# Patient Record
Sex: FEMALE | Race: WHITE | Employment: FULL TIME | URBAN - METROPOLITAN AREA
[De-identification: names, ages, dates, MRNs, and addresses within clinical notes are randomized per-mention and may not be internally consistent; named-entity substitution may affect disease eponyms.]

---

## 2024-03-11 ENCOUNTER — APPOINTMENT (OUTPATIENT)
Dept: CT IMAGING | Age: 39
End: 2024-03-11
Payer: COMMERCIAL

## 2024-03-11 ENCOUNTER — HOSPITAL ENCOUNTER (EMERGENCY)
Age: 39
Discharge: HOME | End: 2024-03-12
Attending: EMERGENCY MEDICINE
Payer: COMMERCIAL

## 2024-03-11 DIAGNOSIS — R10.84 GENERALIZED ABDOMINAL PAIN: Primary | ICD-10-CM

## 2024-03-11 DIAGNOSIS — K80.80 BILIARY CALCULUS OF OTHER SITE WITHOUT OBSTRUCTION: ICD-10-CM

## 2024-03-11 LAB
ALBUMIN SERPL-MCNC: 4.4 G/DL (ref 3.4–5)
ALBUMIN/GLOB SERPL: 2.1 {RATIO} (ref 1.1–2.2)
ALP SERPL-CCNC: 113 U/L (ref 40–129)
ALT SERPL-CCNC: 31 U/L (ref 10–40)
ANION GAP SERPL CALCULATED.3IONS-SCNC: 14 MMOL/L (ref 3–16)
AST SERPL-CCNC: 31 U/L (ref 15–37)
BASOPHILS # BLD: 0 K/UL (ref 0–0.2)
BASOPHILS NFR BLD: 0.3 %
BILIRUB SERPL-MCNC: <0.2 MG/DL (ref 0–1)
BILIRUB UR QL STRIP.AUTO: NEGATIVE
BUN SERPL-MCNC: 17 MG/DL (ref 7–20)
CALCIUM SERPL-MCNC: 7.9 MG/DL (ref 8.3–10.6)
CHLORIDE SERPL-SCNC: 104 MMOL/L (ref 99–110)
CLARITY UR: CLEAR
CO2 SERPL-SCNC: 21 MMOL/L (ref 21–32)
COLOR UR: YELLOW
CREAT SERPL-MCNC: 0.7 MG/DL (ref 0.6–1.1)
DEPRECATED RDW RBC AUTO: 16 % (ref 12.4–15.4)
EOSINOPHIL # BLD: 0 K/UL (ref 0–0.6)
EOSINOPHIL NFR BLD: 0.5 %
FLUAV RNA RESP QL NAA+PROBE: NOT DETECTED
FLUBV RNA RESP QL NAA+PROBE: NOT DETECTED
GFR SERPLBLD CREATININE-BSD FMLA CKD-EPI: >60 ML/MIN/{1.73_M2}
GLUCOSE SERPL-MCNC: 93 MG/DL (ref 70–99)
GLUCOSE UR STRIP.AUTO-MCNC: NEGATIVE MG/DL
HCG UR QL: NEGATIVE
HCT VFR BLD AUTO: 38.7 % (ref 36–48)
HGB BLD-MCNC: 12.3 G/DL (ref 12–16)
HGB UR QL STRIP.AUTO: NEGATIVE
KETONES UR STRIP.AUTO-MCNC: 15 MG/DL
LACTATE BLDV-SCNC: 1.5 MMOL/L (ref 0.4–1.9)
LEUKOCYTE ESTERASE UR QL STRIP.AUTO: NEGATIVE
LIPASE SERPL-CCNC: 38 U/L (ref 13–60)
LYMPHOCYTES # BLD: 0.5 K/UL (ref 1–5.1)
LYMPHOCYTES NFR BLD: 5.2 %
MCH RBC QN AUTO: 29.4 PG (ref 26–34)
MCHC RBC AUTO-ENTMCNC: 31.8 G/DL (ref 31–36)
MCV RBC AUTO: 92.4 FL (ref 80–100)
MONOCYTES # BLD: 0.3 K/UL (ref 0–1.3)
MONOCYTES NFR BLD: 2.6 %
NEUTROPHILS # BLD: 9.4 K/UL (ref 1.7–7.7)
NEUTROPHILS NFR BLD: 91.4 %
NITRITE UR QL STRIP.AUTO: NEGATIVE
PH UR STRIP.AUTO: 6 [PH] (ref 5–8)
PLATELET # BLD AUTO: 306 K/UL (ref 135–450)
PMV BLD AUTO: 9.2 FL (ref 5–10.5)
POTASSIUM SERPL-SCNC: 4.2 MMOL/L (ref 3.5–5.1)
PROT SERPL-MCNC: 6.5 G/DL (ref 6.4–8.2)
PROT UR STRIP.AUTO-MCNC: NEGATIVE MG/DL
RBC # BLD AUTO: 4.18 M/UL (ref 4–5.2)
SARS-COV-2 RNA RESP QL NAA+PROBE: NOT DETECTED
SODIUM SERPL-SCNC: 139 MMOL/L (ref 136–145)
SP GR UR STRIP.AUTO: 1.02 (ref 1–1.03)
UA COMPLETE W REFLEX CULTURE PNL UR: ABNORMAL
UA DIPSTICK W REFLEX MICRO PNL UR: ABNORMAL
URN SPEC COLLECT METH UR: ABNORMAL
UROBILINOGEN UR STRIP-ACNC: 0.2 E.U./DL
WBC # BLD AUTO: 10.2 K/UL (ref 4–11)

## 2024-03-11 PROCEDURE — 83690 ASSAY OF LIPASE: CPT

## 2024-03-11 PROCEDURE — 80053 COMPREHEN METABOLIC PANEL: CPT

## 2024-03-11 PROCEDURE — 96374 THER/PROPH/DIAG INJ IV PUSH: CPT

## 2024-03-11 PROCEDURE — 81003 URINALYSIS AUTO W/O SCOPE: CPT

## 2024-03-11 PROCEDURE — 6360000004 HC RX CONTRAST MEDICATION: Performed by: EMERGENCY MEDICINE

## 2024-03-11 PROCEDURE — 36415 COLL VENOUS BLD VENIPUNCTURE: CPT

## 2024-03-11 PROCEDURE — 83605 ASSAY OF LACTIC ACID: CPT

## 2024-03-11 PROCEDURE — 85025 COMPLETE CBC W/AUTO DIFF WBC: CPT

## 2024-03-11 PROCEDURE — 87636 SARSCOV2 & INF A&B AMP PRB: CPT

## 2024-03-11 PROCEDURE — 84703 CHORIONIC GONADOTROPIN ASSAY: CPT

## 2024-03-11 PROCEDURE — 74177 CT ABD & PELVIS W/CONTRAST: CPT

## 2024-03-11 PROCEDURE — 2580000003 HC RX 258: Performed by: EMERGENCY MEDICINE

## 2024-03-11 PROCEDURE — 6370000000 HC RX 637 (ALT 250 FOR IP): Performed by: EMERGENCY MEDICINE

## 2024-03-11 PROCEDURE — 96375 TX/PRO/DX INJ NEW DRUG ADDON: CPT

## 2024-03-11 PROCEDURE — 6360000002 HC RX W HCPCS: Performed by: EMERGENCY MEDICINE

## 2024-03-11 PROCEDURE — 99285 EMERGENCY DEPT VISIT HI MDM: CPT

## 2024-03-11 RX ORDER — METOCLOPRAMIDE 10 MG/1
10 TABLET ORAL
Qty: 120 TABLET | Refills: 3 | Status: SHIPPED | OUTPATIENT
Start: 2024-03-11

## 2024-03-11 RX ORDER — OXYCODONE HYDROCHLORIDE 5 MG/1
5 TABLET ORAL ONCE
Status: COMPLETED | OUTPATIENT
Start: 2024-03-11 | End: 2024-03-11

## 2024-03-11 RX ORDER — MORPHINE SULFATE 4 MG/ML
4 INJECTION, SOLUTION INTRAMUSCULAR; INTRAVENOUS ONCE
Status: COMPLETED | OUTPATIENT
Start: 2024-03-11 | End: 2024-03-11

## 2024-03-11 RX ORDER — METOCLOPRAMIDE HYDROCHLORIDE 5 MG/ML
10 INJECTION INTRAMUSCULAR; INTRAVENOUS ONCE
Status: COMPLETED | OUTPATIENT
Start: 2024-03-11 | End: 2024-03-11

## 2024-03-11 RX ORDER — ONDANSETRON 2 MG/ML
4 INJECTION INTRAMUSCULAR; INTRAVENOUS ONCE
Status: COMPLETED | OUTPATIENT
Start: 2024-03-11 | End: 2024-03-11

## 2024-03-11 RX ORDER — OXYCODONE HYDROCHLORIDE 5 MG/1
5 TABLET ORAL EVERY 6 HOURS PRN
Qty: 12 TABLET | Refills: 0 | Status: SHIPPED | OUTPATIENT
Start: 2024-03-11 | End: 2024-03-14

## 2024-03-11 RX ORDER — 0.9 % SODIUM CHLORIDE 0.9 %
1000 INTRAVENOUS SOLUTION INTRAVENOUS ONCE
Status: COMPLETED | OUTPATIENT
Start: 2024-03-11 | End: 2024-03-11

## 2024-03-11 RX ORDER — POLYETHYLENE GLYCOL 3350 17 G/17G
17 POWDER, FOR SOLUTION ORAL DAILY
Qty: 255 G | Refills: 1 | Status: SHIPPED | OUTPATIENT
Start: 2024-03-11 | End: 2024-03-16

## 2024-03-11 RX ORDER — MORPHINE SULFATE 4 MG/ML
4 INJECTION, SOLUTION INTRAMUSCULAR; INTRAVENOUS ONCE
Status: DISCONTINUED | OUTPATIENT
Start: 2024-03-11 | End: 2024-03-11

## 2024-03-11 RX ADMIN — OXYCODONE 5 MG: 5 TABLET ORAL at 23:21

## 2024-03-11 RX ADMIN — METOCLOPRAMIDE 10 MG: 5 INJECTION, SOLUTION INTRAMUSCULAR; INTRAVENOUS at 23:20

## 2024-03-11 RX ADMIN — IOPAMIDOL 75 ML: 755 INJECTION, SOLUTION INTRAVENOUS at 21:48

## 2024-03-11 RX ADMIN — SODIUM CHLORIDE 1000 ML: 9 INJECTION, SOLUTION INTRAVENOUS at 20:13

## 2024-03-11 RX ADMIN — MORPHINE SULFATE 4 MG: 4 INJECTION, SOLUTION INTRAMUSCULAR; INTRAVENOUS at 20:15

## 2024-03-11 RX ADMIN — ONDANSETRON 4 MG: 2 INJECTION INTRAMUSCULAR; INTRAVENOUS at 20:13

## 2024-03-11 ASSESSMENT — PAIN DESCRIPTION - PAIN TYPE: TYPE: ACUTE PAIN

## 2024-03-11 ASSESSMENT — PAIN DESCRIPTION - LOCATION
LOCATION: ABDOMEN

## 2024-03-11 ASSESSMENT — PAIN SCALES - GENERAL
PAINLEVEL_OUTOF10: 6
PAINLEVEL_OUTOF10: 4
PAINLEVEL_OUTOF10: 8

## 2024-03-11 ASSESSMENT — PAIN DESCRIPTION - ORIENTATION
ORIENTATION: MID;UPPER

## 2024-03-11 ASSESSMENT — LIFESTYLE VARIABLES
HOW OFTEN DO YOU HAVE A DRINK CONTAINING ALCOHOL: 4 OR MORE TIMES A WEEK
HOW MANY STANDARD DRINKS CONTAINING ALCOHOL DO YOU HAVE ON A TYPICAL DAY: 3 OR 4

## 2024-03-11 ASSESSMENT — ENCOUNTER SYMPTOMS
DIARRHEA: 1
ABDOMINAL PAIN: 1
CHEST TIGHTNESS: 0
COUGH: 0
VOMITING: 1
NAUSEA: 1
BACK PAIN: 0
RHINORRHEA: 0
SHORTNESS OF BREATH: 0

## 2024-03-11 ASSESSMENT — PAIN DESCRIPTION - DESCRIPTORS
DESCRIPTORS: ACHING;CRAMPING
DESCRIPTORS: ACHING;SHARP

## 2024-03-11 ASSESSMENT — PAIN - FUNCTIONAL ASSESSMENT
PAIN_FUNCTIONAL_ASSESSMENT: 0-10
PAIN_FUNCTIONAL_ASSESSMENT: ACTIVITIES ARE NOT PREVENTED

## 2024-03-12 VITALS
BODY MASS INDEX: 38.41 KG/M2 | TEMPERATURE: 98.8 F | RESPIRATION RATE: 16 BRPM | HEIGHT: 64 IN | DIASTOLIC BLOOD PRESSURE: 66 MMHG | WEIGHT: 225 LBS | SYSTOLIC BLOOD PRESSURE: 115 MMHG | HEART RATE: 77 BPM | OXYGEN SATURATION: 100 %

## 2024-03-12 NOTE — ED NOTES
Pt AxOx4. Pt is satisfied with pain relief at this time. VS charted. Pt resting comfortably in bed. Call light within reach.

## 2024-03-12 NOTE — ED PROVIDER NOTES
Emergency Department Attending Physician Note  Location: [facility] ED  3/11/2024       Pt Name: Shahid Garcia  MRN: [id number]  Birthdate 1985    Date of evaluation: 3/11/2024  Provider: BRIDGET DANIELSON DO  PCP: No primary care provider on file.    Note Started: 8:25 PM EDT 3/11/24    CHIEF COMPLAINT  Chief Complaint   Patient presents with    Abdominal Pain     X3 days, upper  middle abd pain that hurts worse when she eats. Was supposed to have gallbladder removed but pt states \"she doesn't have time for that\"       ROOM: 7    HISTORY OF PRESENT ILLNESS:  History obtained by patient. Limitations to history : None.     Shahid Garcia is a 39 y.o. female with a significant PMHx of MS, narcolepsy, and history of seizure disorder, presenting emergency department today with concerns of nausea and vomiting, at least for 5 times a day for the last 3 days, with left-sided abdominal pain, and also epigastric abdominal pain.  She says anytime she eats, she vomits immediately back up.  She says her abdominal pain feels like a bloating and a pressure.  She has been told that she needed her gallbladder out in the past, however she says she was essentially lost to follow-up and did not make time to go to her follow-up appointments.  Denies any new seizures, or any MS symptoms, and does admit she has not been taking her medicine as she should over the past month.  Has a follow-up neurology appointment next week.  Denies any blood in her vomitus or diarrhea.  He is really concerned that she is having a gallbladder flare.  No other concerns today in the emergency department including fevers or chills.  No known sick contacts.  No suspicious food intake.  No falls or injuries.  No back pain or dysuria.  No headaches or vision changes.      Nursing Notes were all reviewed and agreed with or any disagreements were addressed in the HPI.      MEDICAL HISTORY  Past Medical History:   Diagnosis Date    MS

## 2025-03-18 ENCOUNTER — HOSPITAL ENCOUNTER (EMERGENCY)
Age: 40
Discharge: LEFT BEFORE DISCHARGE | End: 2025-03-18
Attending: EMERGENCY MEDICINE
Payer: COMMERCIAL

## 2025-03-18 VITALS
SYSTOLIC BLOOD PRESSURE: 117 MMHG | BODY MASS INDEX: 36.54 KG/M2 | HEIGHT: 64 IN | OXYGEN SATURATION: 97 % | TEMPERATURE: 98.4 F | RESPIRATION RATE: 20 BRPM | HEART RATE: 74 BPM | WEIGHT: 214 LBS | DIASTOLIC BLOOD PRESSURE: 79 MMHG

## 2025-03-18 DIAGNOSIS — F10.20 ALCOHOLISM (HCC): Primary | ICD-10-CM

## 2025-03-18 PROCEDURE — 99281 EMR DPT VST MAYX REQ PHY/QHP: CPT

## 2025-03-18 RX ORDER — LORAZEPAM 1 MG/1
1 TABLET ORAL ONCE
Status: DISCONTINUED | OUTPATIENT
Start: 2025-03-18 | End: 2025-03-19 | Stop reason: HOSPADM

## 2025-03-19 NOTE — ED NOTES
Pt not in room. Pt told Dr. Miranda, \"Im just going to come back in the morning when I am actually in withdraw\". Pt left department in stable condition per Dr. Miranda.

## 2025-07-08 ENCOUNTER — HOSPITAL ENCOUNTER (INPATIENT)
Age: 40
LOS: 3 days | Discharge: HOME | End: 2025-07-11
Attending: EMERGENCY MEDICINE | Admitting: PSYCHIATRY & NEUROLOGY
Payer: COMMERCIAL

## 2025-07-08 DIAGNOSIS — F10.920 ACUTE ALCOHOLIC INTOXICATION WITHOUT COMPLICATION: ICD-10-CM

## 2025-07-08 DIAGNOSIS — T51.92XA: ICD-10-CM

## 2025-07-08 DIAGNOSIS — R45.851 SUICIDAL IDEATION: Primary | ICD-10-CM

## 2025-07-08 PROBLEM — F33.9 MAJOR DEPRESSION, RECURRENT: Status: ACTIVE | Noted: 2025-07-08

## 2025-07-08 PROBLEM — F32.9 MAJOR DEPRESSIVE EPISODE: Status: ACTIVE | Noted: 2025-07-08

## 2025-07-08 LAB
ALBUMIN SERPL-MCNC: 3.8 G/DL (ref 3.4–5)
ALBUMIN/GLOB SERPL: 1.7 {RATIO} (ref 1.1–2.2)
ALP SERPL-CCNC: 130 U/L (ref 40–129)
ALT SERPL-CCNC: 18 U/L (ref 10–40)
AMPHETAMINES UR QL SCN>1000 NG/ML: ABNORMAL
ANION GAP SERPL CALCULATED.3IONS-SCNC: 11 MMOL/L (ref 3–16)
APAP SERPL-MCNC: 9 UG/ML (ref 10–30)
AST SERPL-CCNC: 32 U/L (ref 15–37)
BARBITURATES UR QL SCN>200 NG/ML: ABNORMAL
BASOPHILS # BLD: 0 K/UL (ref 0–0.2)
BASOPHILS NFR BLD: 1 %
BENZODIAZ UR QL SCN>200 NG/ML: POSITIVE
BILIRUB SERPL-MCNC: <0.2 MG/DL (ref 0–1)
BILIRUB UR QL STRIP.AUTO: NEGATIVE
BUN SERPL-MCNC: 15 MG/DL (ref 7–20)
CALCIUM SERPL-MCNC: 8.5 MG/DL (ref 8.3–10.6)
CANNABINOIDS UR QL SCN>50 NG/ML: ABNORMAL
CHLORIDE SERPL-SCNC: 107 MMOL/L (ref 99–110)
CLARITY UR: ABNORMAL
CO2 SERPL-SCNC: 22 MMOL/L (ref 21–32)
COCAINE UR QL SCN: ABNORMAL
COLOR UR: YELLOW
CREAT SERPL-MCNC: 0.7 MG/DL (ref 0.6–1.1)
DEPRECATED RDW RBC AUTO: 14.9 % (ref 12.4–15.4)
DRUG SCREEN COMMENT UR-IMP: ABNORMAL
EOSINOPHIL # BLD: 0.1 K/UL (ref 0–0.6)
EOSINOPHIL NFR BLD: 3.4 %
ETHANOLAMINE SERPL-MCNC: 188 MG/DL (ref 0–0.08)
ETHANOLAMINE SERPL-MCNC: 47 MG/DL (ref 0–0.08)
FENTANYL SCREEN, URINE: ABNORMAL
GFR SERPLBLD CREATININE-BSD FMLA CKD-EPI: >90 ML/MIN/{1.73_M2}
GLUCOSE SERPL-MCNC: 160 MG/DL (ref 70–99)
GLUCOSE UR STRIP.AUTO-MCNC: NEGATIVE MG/DL
HCG UR QL: NEGATIVE
HCT VFR BLD AUTO: 37.8 % (ref 36–48)
HGB BLD-MCNC: 12.5 G/DL (ref 12–16)
HGB UR QL STRIP.AUTO: NEGATIVE
KETONES UR STRIP.AUTO-MCNC: NEGATIVE MG/DL
LEUKOCYTE ESTERASE UR QL STRIP.AUTO: NEGATIVE
LYMPHOCYTES # BLD: 0.9 K/UL (ref 1–5.1)
LYMPHOCYTES NFR BLD: 23.5 %
MCH RBC QN AUTO: 31.3 PG (ref 26–34)
MCHC RBC AUTO-ENTMCNC: 33 G/DL (ref 31–36)
MCV RBC AUTO: 94.8 FL (ref 80–100)
METHADONE UR QL SCN>300 NG/ML: ABNORMAL
MONOCYTES # BLD: 0.3 K/UL (ref 0–1.3)
MONOCYTES NFR BLD: 7.8 %
NEUTROPHILS # BLD: 2.3 K/UL (ref 1.7–7.7)
NEUTROPHILS NFR BLD: 64.3 %
NITRITE UR QL STRIP.AUTO: NEGATIVE
OPIATES UR QL SCN>300 NG/ML: ABNORMAL
OXYCODONE UR QL SCN: ABNORMAL
PCP UR QL SCN>25 NG/ML: ABNORMAL
PH UR STRIP.AUTO: 6 [PH] (ref 5–8)
PH UR STRIP: 5 [PH]
PLATELET # BLD AUTO: 221 K/UL (ref 135–450)
PMV BLD AUTO: 8.4 FL (ref 5–10.5)
POTASSIUM SERPL-SCNC: 4.2 MMOL/L (ref 3.5–5.1)
PROT SERPL-MCNC: 6.1 G/DL (ref 6.4–8.2)
PROT UR STRIP.AUTO-MCNC: NEGATIVE MG/DL
RBC # BLD AUTO: 3.99 M/UL (ref 4–5.2)
SALICYLATES SERPL-MCNC: <0.5 MG/DL (ref 15–30)
SODIUM SERPL-SCNC: 140 MMOL/L (ref 136–145)
SP GR UR STRIP.AUTO: 1.02 (ref 1–1.03)
TSH SERPL DL<=0.005 MIU/L-ACNC: 1.29 UIU/ML (ref 0.27–4.2)
UA COMPLETE W REFLEX CULTURE PNL UR: ABNORMAL
UA DIPSTICK W REFLEX MICRO PNL UR: ABNORMAL
URN SPEC COLLECT METH UR: ABNORMAL
UROBILINOGEN UR STRIP-ACNC: 0.2 E.U./DL
WBC # BLD AUTO: 3.6 K/UL (ref 4–11)

## 2025-07-08 PROCEDURE — 99285 EMERGENCY DEPT VISIT HI MDM: CPT

## 2025-07-08 PROCEDURE — 80307 DRUG TEST PRSMV CHEM ANLYZR: CPT

## 2025-07-08 PROCEDURE — 84443 ASSAY THYROID STIM HORMONE: CPT

## 2025-07-08 PROCEDURE — 36415 COLL VENOUS BLD VENIPUNCTURE: CPT

## 2025-07-08 PROCEDURE — 80061 LIPID PANEL: CPT

## 2025-07-08 PROCEDURE — 1240000000 HC EMOTIONAL WELLNESS R&B

## 2025-07-08 PROCEDURE — 6370000000 HC RX 637 (ALT 250 FOR IP): Performed by: PHYSICIAN ASSISTANT

## 2025-07-08 PROCEDURE — 80053 COMPREHEN METABOLIC PANEL: CPT

## 2025-07-08 PROCEDURE — 80179 DRUG ASSAY SALICYLATE: CPT

## 2025-07-08 PROCEDURE — 85025 COMPLETE CBC W/AUTO DIFF WBC: CPT

## 2025-07-08 PROCEDURE — 83036 HEMOGLOBIN GLYCOSYLATED A1C: CPT

## 2025-07-08 PROCEDURE — 6370000000 HC RX 637 (ALT 250 FOR IP): Performed by: PSYCHIATRY & NEUROLOGY

## 2025-07-08 PROCEDURE — 93005 ELECTROCARDIOGRAM TRACING: CPT | Performed by: PSYCHIATRY & NEUROLOGY

## 2025-07-08 PROCEDURE — 80143 DRUG ASSAY ACETAMINOPHEN: CPT

## 2025-07-08 PROCEDURE — 84703 CHORIONIC GONADOTROPIN ASSAY: CPT

## 2025-07-08 PROCEDURE — 81003 URINALYSIS AUTO W/O SCOPE: CPT

## 2025-07-08 PROCEDURE — 82077 ASSAY SPEC XCP UR&BREATH IA: CPT

## 2025-07-08 RX ORDER — DIAZEPAM 10 MG/2ML
15 INJECTION, SOLUTION INTRAMUSCULAR; INTRAVENOUS EVERY 4 HOURS PRN
Status: DISCONTINUED | OUTPATIENT
Start: 2025-07-08 | End: 2025-07-09

## 2025-07-08 RX ORDER — MAGNESIUM HYDROXIDE/ALUMINUM HYDROXICE/SIMETHICONE 120; 1200; 1200 MG/30ML; MG/30ML; MG/30ML
30 SUSPENSION ORAL EVERY 6 HOURS PRN
Status: DISCONTINUED | OUTPATIENT
Start: 2025-07-08 | End: 2025-07-11 | Stop reason: HOSPADM

## 2025-07-08 RX ORDER — DIAZEPAM 10 MG/2ML
10 INJECTION, SOLUTION INTRAMUSCULAR; INTRAVENOUS
Status: DISCONTINUED | OUTPATIENT
Start: 2025-07-08 | End: 2025-07-08

## 2025-07-08 RX ORDER — DIAZEPAM 10 MG/2ML
20 INJECTION, SOLUTION INTRAMUSCULAR; INTRAVENOUS
Status: DISCONTINUED | OUTPATIENT
Start: 2025-07-08 | End: 2025-07-09

## 2025-07-08 RX ORDER — DIAZEPAM 10 MG/2ML
5 INJECTION, SOLUTION INTRAMUSCULAR; INTRAVENOUS
Status: DISCONTINUED | OUTPATIENT
Start: 2025-07-08 | End: 2025-07-08

## 2025-07-08 RX ORDER — OLANZAPINE 10 MG/1
10 TABLET, FILM COATED ORAL EVERY 4 HOURS PRN
Status: DISCONTINUED | OUTPATIENT
Start: 2025-07-08 | End: 2025-07-11 | Stop reason: HOSPADM

## 2025-07-08 RX ORDER — DIAZEPAM 5 MG/1
20 TABLET ORAL
Status: DISCONTINUED | OUTPATIENT
Start: 2025-07-08 | End: 2025-07-08

## 2025-07-08 RX ORDER — DIAZEPAM 10 MG/2ML
10 INJECTION, SOLUTION INTRAMUSCULAR; INTRAVENOUS
Status: DISCONTINUED | OUTPATIENT
Start: 2025-07-08 | End: 2025-07-09

## 2025-07-08 RX ORDER — BENZTROPINE MESYLATE 1 MG/ML
2 INJECTION, SOLUTION INTRAMUSCULAR; INTRAVENOUS 2 TIMES DAILY PRN
Status: DISCONTINUED | OUTPATIENT
Start: 2025-07-08 | End: 2025-07-11 | Stop reason: HOSPADM

## 2025-07-08 RX ORDER — DIAZEPAM 5 MG/1
10 TABLET ORAL
Status: DISCONTINUED | OUTPATIENT
Start: 2025-07-08 | End: 2025-07-08

## 2025-07-08 RX ORDER — DIAZEPAM 5 MG/1
5 TABLET ORAL
Status: DISCONTINUED | OUTPATIENT
Start: 2025-07-08 | End: 2025-07-08

## 2025-07-08 RX ORDER — DIAZEPAM 10 MG/1
20 TABLET ORAL
Status: DISCONTINUED | OUTPATIENT
Start: 2025-07-08 | End: 2025-07-09

## 2025-07-08 RX ORDER — NICOTINE 21 MG/24HR
1 PATCH, TRANSDERMAL 24 HOURS TRANSDERMAL DAILY
Status: DISCONTINUED | OUTPATIENT
Start: 2025-07-08 | End: 2025-07-11 | Stop reason: HOSPADM

## 2025-07-08 RX ORDER — ONDANSETRON 4 MG/1
4 TABLET, ORALLY DISINTEGRATING ORAL ONCE
Status: COMPLETED | OUTPATIENT
Start: 2025-07-08 | End: 2025-07-08

## 2025-07-08 RX ORDER — DIAZEPAM 5 MG/1
5 TABLET ORAL
Status: DISCONTINUED | OUTPATIENT
Start: 2025-07-08 | End: 2025-07-09

## 2025-07-08 RX ORDER — LANOLIN ALCOHOL/MO/W.PET/CERES
100 CREAM (GRAM) TOPICAL DAILY
Status: DISCONTINUED | OUTPATIENT
Start: 2025-07-08 | End: 2025-07-11 | Stop reason: HOSPADM

## 2025-07-08 RX ORDER — LORAZEPAM 1 MG/1
1 TABLET ORAL ONCE
Status: COMPLETED | OUTPATIENT
Start: 2025-07-08 | End: 2025-07-08

## 2025-07-08 RX ORDER — DIAZEPAM 5 MG/1
15 TABLET ORAL
Status: DISCONTINUED | OUTPATIENT
Start: 2025-07-08 | End: 2025-07-08

## 2025-07-08 RX ORDER — DIAZEPAM 10 MG/1
10 TABLET ORAL
Status: DISCONTINUED | OUTPATIENT
Start: 2025-07-08 | End: 2025-07-09

## 2025-07-08 RX ORDER — TRAZODONE HYDROCHLORIDE 50 MG/1
50 TABLET ORAL NIGHTLY PRN
Status: DISCONTINUED | OUTPATIENT
Start: 2025-07-08 | End: 2025-07-11 | Stop reason: HOSPADM

## 2025-07-08 RX ORDER — DIAZEPAM 10 MG/2ML
20 INJECTION, SOLUTION INTRAMUSCULAR; INTRAVENOUS
Status: DISCONTINUED | OUTPATIENT
Start: 2025-07-08 | End: 2025-07-08

## 2025-07-08 RX ORDER — SODIUM CHLORIDE 0.9 % (FLUSH) 0.9 %
5-40 SYRINGE (ML) INJECTION EVERY 12 HOURS SCHEDULED
Status: DISCONTINUED | OUTPATIENT
Start: 2025-07-08 | End: 2025-07-09

## 2025-07-08 RX ORDER — SODIUM CHLORIDE 0.9 % (FLUSH) 0.9 %
5-40 SYRINGE (ML) INJECTION PRN
Status: DISCONTINUED | OUTPATIENT
Start: 2025-07-08 | End: 2025-07-11 | Stop reason: HOSPADM

## 2025-07-08 RX ORDER — HYDROXYZINE HYDROCHLORIDE 50 MG/1
50 TABLET, FILM COATED ORAL 3 TIMES DAILY PRN
Status: DISCONTINUED | OUTPATIENT
Start: 2025-07-08 | End: 2025-07-11 | Stop reason: HOSPADM

## 2025-07-08 RX ORDER — SODIUM CHLORIDE 9 MG/ML
INJECTION, SOLUTION INTRAVENOUS PRN
Status: DISCONTINUED | OUTPATIENT
Start: 2025-07-08 | End: 2025-07-11 | Stop reason: HOSPADM

## 2025-07-08 RX ORDER — IBUPROFEN 400 MG/1
400 TABLET, FILM COATED ORAL EVERY 6 HOURS PRN
Status: DISCONTINUED | OUTPATIENT
Start: 2025-07-08 | End: 2025-07-11 | Stop reason: HOSPADM

## 2025-07-08 RX ORDER — ACETAMINOPHEN 325 MG/1
650 TABLET ORAL EVERY 4 HOURS PRN
Status: DISCONTINUED | OUTPATIENT
Start: 2025-07-08 | End: 2025-07-11 | Stop reason: HOSPADM

## 2025-07-08 RX ORDER — POLYETHYLENE GLYCOL 3350 17 G
2 POWDER IN PACKET (EA) ORAL
Status: DISCONTINUED | OUTPATIENT
Start: 2025-07-08 | End: 2025-07-11 | Stop reason: HOSPADM

## 2025-07-08 RX ORDER — DIAZEPAM 10 MG/2ML
5 INJECTION, SOLUTION INTRAMUSCULAR; INTRAVENOUS
Status: DISCONTINUED | OUTPATIENT
Start: 2025-07-08 | End: 2025-07-09

## 2025-07-08 RX ADMIN — DIAZEPAM 10 MG: 10 TABLET ORAL at 22:05

## 2025-07-08 RX ADMIN — Medication 100 MG: at 19:50

## 2025-07-08 RX ADMIN — ONDANSETRON 4 MG: 4 TABLET, ORALLY DISINTEGRATING ORAL at 21:54

## 2025-07-08 RX ADMIN — LORAZEPAM 1 MG: 1 TABLET ORAL at 19:50

## 2025-07-08 ASSESSMENT — LIFESTYLE VARIABLES
HOW MANY STANDARD DRINKS CONTAINING ALCOHOL DO YOU HAVE ON A TYPICAL DAY: 10 OR MORE
HOW OFTEN DO YOU HAVE A DRINK CONTAINING ALCOHOL: 4 OR MORE TIMES A WEEK
HOW MANY STANDARD DRINKS CONTAINING ALCOHOL DO YOU HAVE ON A TYPICAL DAY: 10 OR MORE
HOW OFTEN DO YOU HAVE A DRINK CONTAINING ALCOHOL: 4 OR MORE TIMES A WEEK

## 2025-07-08 ASSESSMENT — PATIENT HEALTH QUESTIONNAIRE - PHQ9
1. LITTLE INTEREST OR PLEASURE IN DOING THINGS: SEVERAL DAYS
2. FEELING DOWN, DEPRESSED OR HOPELESS: SEVERAL DAYS
SUM OF ALL RESPONSES TO PHQ QUESTIONS 1-9: 2

## 2025-07-08 ASSESSMENT — PAIN - FUNCTIONAL ASSESSMENT: PAIN_FUNCTIONAL_ASSESSMENT: NONE - DENIES PAIN

## 2025-07-08 ASSESSMENT — SLEEP AND FATIGUE QUESTIONNAIRES
SLEEP PATTERN: DIFFICULTY FALLING ASLEEP;DISTURBED/INTERRUPTED SLEEP;RESTLESSNESS
DO YOU USE A SLEEP AID: YES
DO YOU HAVE DIFFICULTY SLEEPING: YES
AVERAGE NUMBER OF SLEEP HOURS: 4

## 2025-07-08 NOTE — ED NOTES
Patient presented to ED via Private Vehicle for evaluation. Explained process of securing patient belongings for patient/staff safety, patient verbalized understanding. Security at bedside, metal detector wanding completed. Patient changed into safety gown. All belongings itemized by  Cordell, placed in labeled bag, removed from the patient care area, and taken to the security locker. Itemized list placed with belongings, in patient record, and a copy given to the patient.

## 2025-07-08 NOTE — ED PROVIDER NOTES
[facility] EMERGENCY DEPARTMENT  EMERGENCY DEPARTMENT ENCOUNTER        Pt Name: Shahid Garcia  MRN: [id number]  Birthdate 1985  Date of evaluation: 7/8/2025  Provider: Mesha Mckeon PA-C  PCP: No primary care provider on file.  Note Started: 4:42 PM EDT 7/8/25       I have seen and evaluated this patient with my supervising physician Noemi Johnston MD.    CHIEF COMPLAINT       Chief Complaint   Patient presents with    Suicide Attempt     Pt in with SI attempt. Pt said she just lost her fiance and wanted to kill herself today by drinking alcohol. Pt states she has been drinking about 2/5th's of vodka daily for about 2 weeks, but today had a whole bottle.        HISTORY OF PRESENT ILLNESS: 1 or more Elements     History From: Patient    Limitations to history : None    Social Determinants Significantly Affecting Health : None    Chief Complaint: Suicide attempt    Shahid Garcia is a 40 y.o. female with a PMHx of multiple sclerosis, narcolepsy, who presents to the ED reporting that she attempted to kill herself by drinking alcohol.  Patient notes that her fiancé checked into rehab recently, and since being alone at home she has broken her 3-month sobriety and began drinking.  She has been drinking 2/5ths of vodka daily, and today drank the entire bottle with the intent to kill herself.  She has had multiple suicide attempts before, withdrawing, Tylenol overdose, and alcohol poisoning.  She denies HI.  She does report she has had alcohol withdrawal before, usually experiences tremors and hallucinations, but has never had any seizures.  She quotes her last drink was \"a little bit ago\" but does say that she is intoxicated currently.  She reports that she called her psychiatrist earlier today, and was told to check her self in for inpatient admission after her suicide attempt.  She denies any current medical complaints.    Nursing Notes were all reviewed and agreed with or any disagreements were     All other components within normal limits   SALICYLATE LEVEL - Abnormal; Notable for the following components:    Salicylate Lvl <0.5 (*)     All other components within normal limits   CBC WITH AUTO DIFFERENTIAL - Abnormal; Notable for the following components:    WBC 3.6 (*)     RBC 3.99 (*)     Lymphocytes Absolute 0.9 (*)     All other components within normal limits   COMPREHENSIVE METABOLIC PANEL W/ REFLEX TO MG FOR LOW K - Abnormal; Notable for the following components:    Glucose 160 (*)     Total Protein 6.1 (*)     Alkaline Phosphatase 130 (*)     All other components within normal limits   URINE DRUG SCREEN - Abnormal; Notable for the following components:    Benzodiazepine Screen, Urine POSITIVE (*)     All other components within normal limits   ETHANOL   PREGNANCY, URINE   ETHANOL   TSH   LIPID PANEL   HEMOGLOBIN A1C       When ordered only abnormal lab results are displayed. All other labs were within normal range or not returned as of this dictation.    EKG: When ordered, EKG's are interpreted by the Emergency Department Physician in the absence of a cardiologist.  Please see their note for interpretation of EKG.    RADIOLOGY:   Non-plain film images such as CT, Ultrasound and MRI are read by the radiologist. Plain radiographic images are visualized and preliminarily interpreted by the ED Provider with the below findings:      N/a    Interpretation per the Radiologist below, if available at the time of this note:    No orders to display     No results found.     No results found.    PROCEDURES   Unless otherwise noted below, none     Procedures    CRITICAL CARE TIME (.cctime)   I independently provided 0 minutes out of the total shared critical care time, excluding separately billable procedures.       EMERGENCY DEPARTMENT COURSE and DIFFERENTIAL DIAGNOSIS/MDM:   Vitals:    Vitals:    07/08/25 1511 07/08/25 2100   BP: 107/66 (!) 144/87   Pulse: 73 69   Resp: 18 16   Temp: 98.4 °F (36.9 °C) 98.4 °F

## 2025-07-08 NOTE — ED NOTES
Transfer Center Handoff for Behavioral Health Transfers      Patient's Current Location: [facility] EMERGENCY DEPARTMENT     Chief Complaint   Patient presents with    Suicide Attempt     Pt in with SI attempt. Pt said she just lost her fiance and wanted to kill herself today by drinking alcohol. Pt states she has been drinking about 2/5th's of vodka daily for about 2 weeks, but today had a whole bottle.        Current or History of Violent Behavior: No    Currently in Restraints Now or During this Encounter: No  (Specify if Agitation or self harm is noted in ED?)  If yes, please describe behaviors requiring restraint:             Medical Clearance Documented and Verified in the Chart: Yes    Allergies   Allergen Reactions    Nsaids Other (See Comments)     ulcers        Can Patient Tolerate Lying Flat: Yes    Able to Perform ADLs:  Yes  (Specify if able to ambulate or uses any mobility devices such as cane or walker)  Activity:    Level of Assistance:    Assistive Device:    Miscellaneous Devices:      LABS    CBC:   Lab Results   Component Value Date/Time    WBC 3.6 07/08/2025 04:13 PM    RBC 3.99 07/08/2025 04:13 PM    HGB 12.5 07/08/2025 04:13 PM    HCT 37.8 07/08/2025 04:13 PM    MCV 94.8 07/08/2025 04:13 PM    MCH 31.3 07/08/2025 04:13 PM    MCHC 33.0 07/08/2025 04:13 PM    RDW 14.9 07/08/2025 04:13 PM     07/08/2025 04:13 PM    MPV 8.4 07/08/2025 04:13 PM     CMP:   Lab Results   Component Value Date/Time     07/08/2025 04:13 PM    K 4.2 07/08/2025 04:13 PM     07/08/2025 04:13 PM    CO2 22 07/08/2025 04:13 PM    BUN 15 07/08/2025 04:13 PM    CREATININE 0.7 07/08/2025 04:13 PM    AGRATIO 1.7 07/08/2025 04:13 PM    LABGLOM >90 07/08/2025 04:13 PM    LABGLOM >60 03/11/2024 07:45 PM    GLUCOSE 160 07/08/2025 04:13 PM    CALCIUM 8.5 07/08/2025 04:13 PM    BILITOT <0.2 07/08/2025 04:13 PM    ALKPHOS 130 07/08/2025 04:13 PM    AST 32 07/08/2025 04:13 PM    ALT 18 07/08/2025 04:13 PM     Drug  considerations:  No  (pregnancy, autism, developmental disabled, etc.)    Does the patient have confirmed or suspected COVID-19 Symptoms: No  (if yes, test must be completed, if no test is not required)       Last COVID Lab SARS-CoV-2 RNA, RT PCR (no units)   Date Value   03/11/2024 NOT DETECTED              Immunization status:   Immunization History   Administered Date(s) Administered    COVID-19, PFIZER PURPLE top, DILUTE for use, (age 12 y+), 30mcg/0.3mL 03/12/2021, 04/01/2021        Relaxed

## 2025-07-08 NOTE — ED PROVIDER NOTES
AttestationThe patient was seen by me in conjunction with the advanced practice clinician. I have personally performed and/or participated in the history, exam and medical decision making and agree with all pertinent clinical information. All lab results, EKG tracings, and radiographic studies or interpretations were reviewed by me.    I have participated in the key portions of the examination and treatment plan for the patient. I have reviewed the APC's findings and agree.I have personally performed a face-to-face diagnostic evaluation on the patient.  My findings are as follows:    This patient presented with feeling suicidal, recent family stressors, states that she had drank regularly for years, was on Vivitrol and was doing well, but has been drinking daily again for the last 2 weeks.  She states she feels like she wanted to kill herself so she drank a bottle of vodka in an attempt to do that.  .   They appear non toxic.   Vitals viewed.   Exam shows: Constitutional: Patient appears well-developed and well-nourished and is active.  She states that she feels anxious, she is agitated moving about in the bed, reports last drink was in the parking lot before she walked into the emergency department.    HENT:   Head: Normocephalic and atraumatic.   Nose: Nose normal.   Mouth/Throat: Mucous membranes are moist.   Eyes: Conjunctivae, extraocular motions and lids are normal.   Neck: Normal range of motion. Neck supple.   Pulmonary/Chest: Effort normal without distress   Musculoskeletal: Normal range of motion.   Neurological: Patient is alert.  Anxious, hyperactive, speech is clear and articulate, mental status intact, thought process intact, patient exhibits suicidal ideation  Skin: Skin is warm, dry and intact.   Psychiatric:There is a normal mood and affect. The speech is normal and behavior is normal.      Impression: Suicidal, alcohol abuse, likely alcohol dependence  Patient would benefit from hospitalization

## 2025-07-09 PROBLEM — F10.920 ALCOHOLIC INTOXICATION WITHOUT COMPLICATION: Status: ACTIVE | Noted: 2025-07-09

## 2025-07-09 PROBLEM — R45.851 SUICIDAL IDEATION: Status: ACTIVE | Noted: 2025-07-09

## 2025-07-09 PROBLEM — E66.9 OBESITY (BMI 35.0-39.9 WITHOUT COMORBIDITY): Status: ACTIVE | Noted: 2025-07-09

## 2025-07-09 PROBLEM — M54.12 CERVICAL RADICULITIS: Status: ACTIVE | Noted: 2025-07-09

## 2025-07-09 LAB
CHOLEST SERPL-MCNC: 159 MG/DL (ref 0–199)
EKG ATRIAL RATE: 66 BPM
EKG DIAGNOSIS: NORMAL
EKG P AXIS: 57 DEGREES
EKG P-R INTERVAL: 130 MS
EKG Q-T INTERVAL: 378 MS
EKG QRS DURATION: 92 MS
EKG QTC CALCULATION (BAZETT): 396 MS
EKG R AXIS: 30 DEGREES
EKG T AXIS: 44 DEGREES
EKG VENTRICULAR RATE: 66 BPM
EST. AVERAGE GLUCOSE BLD GHB EST-MCNC: 85.3 MG/DL
HBA1C MFR BLD: 4.6 %
HDLC SERPL-MCNC: 88 MG/DL (ref 40–60)
LDLC SERPL CALC-MCNC: 39 MG/DL
TRIGL SERPL-MCNC: 160 MG/DL (ref 0–150)
VLDLC SERPL CALC-MCNC: 32 MG/DL

## 2025-07-09 PROCEDURE — 99222 1ST HOSP IP/OBS MODERATE 55: CPT

## 2025-07-09 PROCEDURE — 1240000000 HC EMOTIONAL WELLNESS R&B

## 2025-07-09 PROCEDURE — 90792 PSYCH DIAG EVAL W/MED SRVCS: CPT | Performed by: PSYCHIATRY & NEUROLOGY

## 2025-07-09 PROCEDURE — 6370000000 HC RX 637 (ALT 250 FOR IP): Performed by: PSYCHIATRY & NEUROLOGY

## 2025-07-09 PROCEDURE — 93010 ELECTROCARDIOGRAM REPORT: CPT | Performed by: INTERNAL MEDICINE

## 2025-07-09 PROCEDURE — 6370000000 HC RX 637 (ALT 250 FOR IP)

## 2025-07-09 PROCEDURE — 6370000000 HC RX 637 (ALT 250 FOR IP): Performed by: PHYSICIAN ASSISTANT

## 2025-07-09 RX ORDER — MULTIVITAMIN WITH IRON
1 TABLET ORAL DAILY
Status: DISCONTINUED | OUTPATIENT
Start: 2025-07-09 | End: 2025-07-11 | Stop reason: HOSPADM

## 2025-07-09 RX ORDER — DOCUSATE SODIUM 100 MG/1
100 CAPSULE, LIQUID FILLED ORAL DAILY PRN
COMMUNITY

## 2025-07-09 RX ORDER — OZANIMOD HYDROCHLORIDE 0.92 MG/1
0.92 CAPSULE ORAL DAILY
COMMUNITY
Start: 2025-03-06 | End: 2025-09-05

## 2025-07-09 RX ORDER — DISULFIRAM 250 MG/1
500 TABLET ORAL DAILY
Status: ON HOLD | COMMUNITY
End: 2025-07-10 | Stop reason: HOSPADM

## 2025-07-09 RX ORDER — GABAPENTIN 300 MG/1
300 CAPSULE ORAL 3 TIMES DAILY
COMMUNITY

## 2025-07-09 RX ORDER — FOLIC ACID 1 MG/1
1 TABLET ORAL DAILY
COMMUNITY

## 2025-07-09 RX ORDER — GABAPENTIN 300 MG/1
300 CAPSULE ORAL 3 TIMES DAILY PRN
Status: DISCONTINUED | OUTPATIENT
Start: 2025-07-09 | End: 2025-07-11 | Stop reason: HOSPADM

## 2025-07-09 RX ORDER — ALPRAZOLAM 1 MG/1
1 TABLET ORAL 2 TIMES DAILY PRN
Status: DISCONTINUED | OUTPATIENT
Start: 2025-07-09 | End: 2025-07-11 | Stop reason: HOSPADM

## 2025-07-09 RX ORDER — DOCUSATE SODIUM 100 MG/1
100 CAPSULE, LIQUID FILLED ORAL DAILY PRN
Status: DISCONTINUED | OUTPATIENT
Start: 2025-07-09 | End: 2025-07-11 | Stop reason: HOSPADM

## 2025-07-09 RX ORDER — MODAFINIL 200 MG/1
200 TABLET ORAL 2 TIMES DAILY
Status: DISCONTINUED | OUTPATIENT
Start: 2025-07-09 | End: 2025-07-10

## 2025-07-09 RX ORDER — MODAFINIL 200 MG/1
200 TABLET ORAL 2 TIMES DAILY
COMMUNITY

## 2025-07-09 RX ORDER — VENLAFAXINE HYDROCHLORIDE 75 MG/1
75 CAPSULE, EXTENDED RELEASE ORAL
COMMUNITY

## 2025-07-09 RX ORDER — CHLORDIAZEPOXIDE HYDROCHLORIDE 5 MG/1
5 CAPSULE, GELATIN COATED ORAL 3 TIMES DAILY
Status: DISCONTINUED | OUTPATIENT
Start: 2025-07-09 | End: 2025-07-09

## 2025-07-09 RX ORDER — FOLIC ACID 1 MG/1
1 TABLET ORAL DAILY
Status: DISCONTINUED | OUTPATIENT
Start: 2025-07-09 | End: 2025-07-11 | Stop reason: HOSPADM

## 2025-07-09 RX ORDER — BUPROPION HYDROCHLORIDE 300 MG/1
300 TABLET ORAL EVERY MORNING
Status: DISCONTINUED | OUTPATIENT
Start: 2025-07-09 | End: 2025-07-11 | Stop reason: HOSPADM

## 2025-07-09 RX ORDER — VENLAFAXINE HYDROCHLORIDE 75 MG/1
75 CAPSULE, EXTENDED RELEASE ORAL
Status: DISCONTINUED | OUTPATIENT
Start: 2025-07-10 | End: 2025-07-11 | Stop reason: HOSPADM

## 2025-07-09 RX ORDER — CHLORDIAZEPOXIDE HYDROCHLORIDE 25 MG/1
25 CAPSULE, GELATIN COATED ORAL 3 TIMES DAILY
Status: DISCONTINUED | OUTPATIENT
Start: 2025-07-09 | End: 2025-07-09

## 2025-07-09 RX ORDER — THIAMINE MONONITRATE (VIT B1) 100 MG
100 TABLET ORAL DAILY
Status: ON HOLD | COMMUNITY
End: 2025-07-10 | Stop reason: HOSPADM

## 2025-07-09 RX ORDER — PROGESTERONE 200 MG/1
200 CAPSULE ORAL NIGHTLY
COMMUNITY

## 2025-07-09 RX ORDER — GABAPENTIN 300 MG/1
300 CAPSULE ORAL 3 TIMES DAILY
Status: DISCONTINUED | OUTPATIENT
Start: 2025-07-09 | End: 2025-07-09

## 2025-07-09 RX ORDER — NICOTINE 21 MG/24HR
1 PATCH, TRANSDERMAL 24 HOURS TRANSDERMAL EVERY 24 HOURS
Status: ON HOLD | COMMUNITY
End: 2025-07-10 | Stop reason: HOSPADM

## 2025-07-09 RX ORDER — BUPROPION HYDROCHLORIDE 150 MG/1
300 TABLET ORAL EVERY MORNING
COMMUNITY

## 2025-07-09 RX ORDER — ALPRAZOLAM 1 MG/1
1 TABLET ORAL 3 TIMES DAILY PRN
COMMUNITY

## 2025-07-09 RX ORDER — FOLIC ACID 1 MG/1
1 TABLET ORAL DAILY
Status: DISCONTINUED | OUTPATIENT
Start: 2025-07-09 | End: 2025-07-09

## 2025-07-09 RX ADMIN — ACETAMINOPHEN 650 MG: 325 TABLET ORAL at 07:43

## 2025-07-09 RX ADMIN — BUPROPION HYDROCHLORIDE 300 MG: 300 TABLET, EXTENDED RELEASE ORAL at 11:23

## 2025-07-09 RX ADMIN — ALPRAZOLAM 1 MG: 1 TABLET ORAL at 20:04

## 2025-07-09 RX ADMIN — ALPRAZOLAM 1 MG: 1 TABLET ORAL at 11:58

## 2025-07-09 RX ADMIN — Medication 100 MG: at 11:24

## 2025-07-09 RX ADMIN — ACETAMINOPHEN 650 MG: 325 TABLET ORAL at 20:04

## 2025-07-09 RX ADMIN — MODAFINIL 200 MG: 200 TABLET ORAL at 11:24

## 2025-07-09 RX ADMIN — THERA TABS 1 TABLET: TAB at 11:24

## 2025-07-09 RX ADMIN — FOLIC ACID 1 MG: 1 TABLET ORAL at 11:24

## 2025-07-09 RX ADMIN — CHOLECALCIFEROL TAB 125 MCG (5000 UNIT) 5000 UNITS: 125 TAB at 11:24

## 2025-07-09 ASSESSMENT — PATIENT HEALTH QUESTIONNAIRE - PHQ9
2. FEELING DOWN, DEPRESSED OR HOPELESS: MORE THAN HALF THE DAYS
SUM OF ALL RESPONSES TO PHQ QUESTIONS 1-9: 17
1. LITTLE INTEREST OR PLEASURE IN DOING THINGS: MORE THAN HALF THE DAYS
7. TROUBLE CONCENTRATING ON THINGS, SUCH AS READING THE NEWSPAPER OR WATCHING TELEVISION: MORE THAN HALF THE DAYS
SUM OF ALL RESPONSES TO PHQ QUESTIONS 1-9: 16
6. FEELING BAD ABOUT YOURSELF - OR THAT YOU ARE A FAILURE OR HAVE LET YOURSELF OR YOUR FAMILY DOWN: SEVERAL DAYS
8. MOVING OR SPEAKING SO SLOWLY THAT OTHER PEOPLE COULD HAVE NOTICED. OR THE OPPOSITE, BEING SO FIGETY OR RESTLESS THAT YOU HAVE BEEN MOVING AROUND A LOT MORE THAN USUAL: MORE THAN HALF THE DAYS
9. THOUGHTS THAT YOU WOULD BE BETTER OFF DEAD, OR OF HURTING YOURSELF: SEVERAL DAYS
10. IF YOU CHECKED OFF ANY PROBLEMS, HOW DIFFICULT HAVE THESE PROBLEMS MADE IT FOR YOU TO DO YOUR WORK, TAKE CARE OF THINGS AT HOME, OR GET ALONG WITH OTHER PEOPLE: VERY DIFFICULT
4. FEELING TIRED OR HAVING LITTLE ENERGY: MORE THAN HALF THE DAYS
5. POOR APPETITE OR OVEREATING: NEARLY EVERY DAY
3. TROUBLE FALLING OR STAYING ASLEEP: MORE THAN HALF THE DAYS
SUM OF ALL RESPONSES TO PHQ QUESTIONS 1-9: 17
SUM OF ALL RESPONSES TO PHQ QUESTIONS 1-9: 17

## 2025-07-09 ASSESSMENT — PAIN - FUNCTIONAL ASSESSMENT
PAIN_FUNCTIONAL_ASSESSMENT: ACTIVITIES ARE NOT PREVENTED
PAIN_FUNCTIONAL_ASSESSMENT: ACTIVITIES ARE NOT PREVENTED

## 2025-07-09 ASSESSMENT — PAIN DESCRIPTION - ONSET
ONSET: ON-GOING
ONSET: ON-GOING

## 2025-07-09 ASSESSMENT — SLEEP AND FATIGUE QUESTIONNAIRES
DO YOU USE A SLEEP AID: NO
AVERAGE NUMBER OF SLEEP HOURS: 5
DO YOU HAVE DIFFICULTY SLEEPING: YES

## 2025-07-09 ASSESSMENT — PAIN DESCRIPTION - PAIN TYPE
TYPE: CHRONIC PAIN
TYPE: CHRONIC PAIN

## 2025-07-09 ASSESSMENT — PAIN DESCRIPTION - FREQUENCY
FREQUENCY: CONTINUOUS
FREQUENCY: INTERMITTENT

## 2025-07-09 ASSESSMENT — PAIN DESCRIPTION - DESCRIPTORS
DESCRIPTORS: ACHING
DESCRIPTORS: ACHING;DISCOMFORT

## 2025-07-09 ASSESSMENT — PAIN SCALES - GENERAL
PAINLEVEL_OUTOF10: 3
PAINLEVEL_OUTOF10: 6
PAINLEVEL_OUTOF10: 0

## 2025-07-09 ASSESSMENT — PAIN DESCRIPTION - LOCATION
LOCATION: NECK
LOCATION: NECK

## 2025-07-09 NOTE — PROGRESS NOTES
Shahid presented to [facility] ED after a suicide attempt.     Shahid told ED staff that she wanted to \"kill myself with alcohol\" and had been drinking two fifths of vodka daily for 2 weeks. Shahid reports that she has been drinking regularly for years, and was doing well on Vivitrol, but her sobriety was in jeopardy after her fiance recently entered an inpatient rehab program. After being home alone, Shahid relapsed and began drinking again.     Shahid reports that she has experienced tremors and hallucinations during withdrawal in the past, but has never had a seizure.     Shahid states that she has attempted to harm herself multiple times and has had several inpatient psychiatric admissions. Shahid reports that she has outpatient mental health resources. She reports that she is prescribed several medications and is compliant with them. Shahid stated that she called her psychiatrist on 7/8/25, described how she felt, and the psychiatrist encouraged her to be evaluated in the ED.     Upon arrival to this unit, Shahid is friendly and pleasant during each interaction with this writer. After completing the admission process, she made a few phone calls. She scored an 11 on the CIWA assessment and received Valium 10 mg PO. This medication was effective. Staff have observed Shahid resting in bed with her eyes closed.     Sahhid denies SI, HI, and AVH.

## 2025-07-09 NOTE — PROGRESS NOTES
4 Eyes Skin Assessment     NAME:  Shahid Garcia  YOB: 1985  MEDICAL RECORD NUMBER:  [id number]    The patient is being assessed for  Admission    I agree that at least one RN has performed a thorough Head to Toe Skin Assessment on the patient. ALL assessment sites listed below have been assessed.      Areas assessed by both nurses:    Head, Face, Ears, Shoulders, Back, Chest, Arms, Elbows, Hands, Sacrum. Buttock, Coccyx, Ischium, and Legs. Feet and Heels        Does the Patient have a Wound? No noted wound(s)       Hugo Prevention initiated by RN: No  Wound Care Orders initiated by RN: No    Pressure Injury (Stage 1,2,3,4, Unstageable, DTI, NWPT, and Complex wounds) if present, place Wound referral order by RN under : No    New Ostomies, if present place, Ostomy referral order under : No     Nurse 1 eSignature: Electronically signed by Bibiana Lord RN on 7/8/25 at 10:51 PM EDT    **SHARE this note so that the co-signing nurse can place an eSignature**    Nurse 2 eSignature: Electronically signed by Miguelangel Reyes RN on 7/9/25 at 5:16 AM EDT

## 2025-07-09 NOTE — ED NOTES
Patient going upstairs with security and sitter. Belongings went with patient. Pink slip also went with patient and security

## 2025-07-09 NOTE — PROGRESS NOTES
[facility]  Admission Note     Admission Type:   Admission Type: Voluntary    Reason for admission:  Reason for Admission: Suicide attempt via excessive alcohol ingestion      Addictive Behavior:   Addictive Behavior  In the Past 3 Months, Have You Felt or Has Someone Told You That You Have a Problem With  : None    Medical Problems:   Past Medical History:   Diagnosis Date    MS (multiple sclerosis) (HCC)     Narcolepsy     Sleep apnea        Status EXAM:  Mental Status and Behavioral Exam  Normal: No  Level of Assistance: Independent/Self  Facial Expression: Flat, Brightened (Brightens w/ 1:1 interaction)  Affect: Congruent  Level of Consciousness: Alert  Frequency of Checks: 4 times per hour, close  Mood:Normal: No  Mood: Depressed, Anxious  Motor Activity:Normal: Yes  Eye Contact: Good  Observed Behavior: Cooperative, Friendly  Sexual Misconduct History: Current - no  Preception: Gazelle to person, Gazelle to time, Gazelle to place, Gazelle to situation  Attention:Normal: Yes  Thought Processes: Unremarkable  Thought Content:Normal: Yes  Depression Symptoms: Change in energy level, Feelings of helplessness, Feelings of hopelessess, Feelings of worthlessness, Isolative, Loss of interest, Sleep disturbance  Anxiety Symptoms: Generalized  Beba Symptoms: No problems reported or observed.  Hallucinations: None (Shahid denmaggie; not noted to be RTIS)  Delusions: No  Memory:Normal: Yes  Insight and Judgment: No  Insight and Judgment: Poor judgment, Poor insight    Tobacco Screening:  Practical Counseling, on admission, ara X, if applicable and completed (first 3 are required if patient doesn't refuse):            ( ) Recognizing danger situations (included triggers and roadblocks)                    ( ) Coping skills (new ways to manage stress,relaxation techniques, changing routine, distraction)                                                           ( ) Basic information about quitting (benefits of
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
## 2025-07-09 NOTE — PROGRESS NOTES
Shahid arrived on this unit from [facility] ED with one ED staff and one security staff at 21:39. Shahid is ambulatory with a steady gait upon arrival. Security staff escorted Shahid through the security checkpoint before she entered this unit. A metal detector wand was utilized to detect contraband. Shahid is alert and oriented x4, calm, and cooperative. She denies current suicidal and homicidal ideation as well as auditory, visual, and tactile hallucinations. This writer provided Shahid with snacks and a beverage as well as oriented her to this unit and her room. Shahid is agreeable to participate in the admission process.

## 2025-07-09 NOTE — GROUP NOTE
Group Therapy Note    Date: 7/9/2025    Group Start Time: 1000  Group End Time: 1045  Group Topic: Psychoeducation    [facility]    Ann Marie Duncan LISW        Group Therapy Note    Attendees: 4       Patient's Goal:   to learn and discuss worksheet on healthy vs unhealthy relationships.     Notes:  pt was invited to group and did not attend.        Signature:  CATALINA Enciso

## 2025-07-09 NOTE — PLAN OF CARE
Problem: Self Harm/Suicidality  Goal: Will have no self-injury during hospital stay  Description: INTERVENTIONS:  1.  Ensure constant observer at bedside with Q15M safety checks  2.  Maintain a safe environment  3.  Secure patient belongings  4.  Ensure family/visitors adhere to safety recommendations  5.  Ensure safety tray has been added to patient's diet order  6.  Every shift and PRN: Re-assess suicidal risk via Frequent Screener    Outcome: Completed     Problem: Drug Abuse/Detox  Goal: Will have no detox symptoms and will verbalize plan for changing drug-related behavior  Description: INTERVENTIONS:  1. Administer medication as ordered  2. Monitor physical status  3. Provide emotional support with 1:1 interaction with staff  4. Encourage  recovery focused treatment   Outcome: Progressing     Problem: Depression/Self Harm  Goal: Effect of psychiatric condition will be minimized and patient will be protected from self harm  Description: INTERVENTIONS:  1. Assess impact of patient's symptoms on level of functioning, self care needs and offer support as indicated  2. Assess patient/family knowledge of depression, impact on illness and need for teaching  3. Provide emotional support, presence and reassurance  4. Assess for possible suicidal thoughts or ideation. If patient expresses suicidal thoughts or statements do not leave alone, initiate Suicide Precautions, move to a room close to the nursing station and obtain sitter  5. Initiate consults as appropriate with Mental Health Professional, Spiritual Care, Psychosocial CNS, and consider a recommendation to the LIP for a Psychiatric Consultation  Outcome: Progressing     Problem: Coping  Goal: Pt/Family able to verbalize concerns and demonstrate effective coping strategies  Description: INTERVENTIONS:  1. Assist patient/family to identify coping skills, available support systems and cultural and spiritual values  2. Provide emotional support, including active

## 2025-07-09 NOTE — PROGRESS NOTES
Home Medication Reconciliation Status          [] COMPLETE       Medication history has been reviewed and obtained from the following source(s):       [] patient/family verbal report             [] patient/family provided written list       [] external pharmacy   [] external facility list         []  Provider notified that home medication reconciliation is complete          [x] IN PROGRESS       Medication reconciliation marked in progress at this time due to:       [] patient/family poor historian      [] waiting arrival of family to clarify       [] waiting for accurate list        [x] external pharmacy needs called      * Follow up is needed.          [] UNABLE TO ASSESS       Medication reconciliation is incomplete and unable to assess at this time due to:       [] critical patient condition   [] patient is unresponsive        [] no family available                       [] unknown pharmacy       [] anonymous patient          * Follow up is needed.      [] Pharmacy consult placed for medication reconciliation assistance   Additional comments: Shahid reports that she is prescribed several medications and is compliant with all of them. She uses [facility] in [place], [place] - [phone]

## 2025-07-09 NOTE — PROGRESS NOTES
CSSR-S Assessment completed with patient who then scored HIGH RISK.     Provider Dr. Anthony Goyal was notified of HIGH RISK score, via Telephone at 22:04.      Were suicide precautions ordered: NO    If not ordered, justification as follows: Shahid denies current suicidal ideation, plan, and intent. She reports that she believes that she can demonstrate safe behavior while on this unit and is willing to reach out to staff if she feels that she can no longer demonstrate safe behavior at any point during this admission.     Completed by: Bibiana ALAMO RN

## 2025-07-09 NOTE — PROGRESS NOTES
Behavioral Services  Medicare Certification Upon Admission    I certify that this patient's inpatient psychiatric hospital admission is medically necessary for:    [x] (1) Treatment which could reasonably be expected to improve this patient's condition,       [x] (2) Or for diagnostic study;     AND     [x](2) The inpatient psychiatric services are provided while the individual is under the care of a physician and are included in the individualized plan of care.    Estimated length of stay/service 3 d    Plan for post-hospital care outpt    Electronically signed by JAKE PALOMARES MD on 7/9/2025 at 10:27 AM

## 2025-07-09 NOTE — H&P
Hospital Medicine History & Physical      PCP: No primary care provider on file.    Date of Admission: 2025    Date of Service: Pt seen/examined on 25    Chief Complaint:    Chief Complaint   Patient presents with    Suicide Attempt     Pt in with SI attempt. Pt said she just lost her fiance and wanted to kill herself today by drinking alcohol. Pt states she has been drinking about 2/5th's of vodka daily for about 2 weeks, but today had a whole bottle.          History Of Present Illness:      The patient is a 40 y.o. female with PMHx alcohol abuse, chronic pain, MS, obesity who presented to ED for SI.  Patient was seen and evaluated in the ED by the ED medical provider, patient was medically cleared for admission to [facility].  This note serves as an admission medical H&P.    Tobacco use: \"not often\"  ETOH use: two 1/5ths per day for the last 2 weeks.  Illicit drug use: denies.    Patient denies any medical complaints.    Past Medical History:        Diagnosis Date    MS (multiple sclerosis) (HCC)     Narcolepsy     Sleep apnea        Past Surgical History:        Procedure Laterality Date     SECTION      GASTRIC BYPASS SURGERY      INDUCED          Medications Prior to Admission:    Prior to Admission medications    Medication Sig Start Date End Date Taking? Authorizing Provider   metoclopramide (REGLAN) 10 MG tablet Take 1 tablet by mouth 3 times daily (with meals) 3/11/24   Darling Wilkins DO       Allergies:  Nsaids    Social History:      TOBACCO:   reports that she has quit smoking. Her smoking use included cigarettes. She uses smokeless tobacco.  ETOH:   reports current alcohol use.      Family History:   Positive as follows:    History reviewed. No pertinent family history.    REVIEW OF SYSTEMS:       Constitutional: Negative for fever   HENT: Negative for sore throat   Eyes: Negative for redness   Respiratory: Negative  for dyspnea, cough   Cardiovascular: Negative for chest  pain   Gastrointestinal: Negative for vomiting, diarrhea   Genitourinary: Negative for hematuria   Musculoskeletal: Negative for arthralgias   Skin: Negative for rash   Neurological: Negative for syncope    Hematological: Negative for easy bruising/bleeding   Psychiatric/Behavorial: Per psychiatry team evaluation     PHYSICAL EXAM:    /75   Pulse 65   Temp 98.8 °F (37.1 °C) (Oral)   Resp 16   Ht 1.626 m (5' 4\")   Wt 104.3 kg (230 lb)   SpO2 99%   Breastfeeding No   BMI 39.48 kg/m²     Gen: No distress. Alert. Obese. Disheveled appearance.   Eyes: PERRL. No sclera icterus. No conjunctival injection.   ENT: No discharge. Pharynx clear.   Neck: No JVD.  Trachea midline.  Resp: No accessory muscle use. No crackles. No wheezes. No rhonchi.   CV: Regular rate. Regular rhythm. No murmur.  No rub. No edema.   GI: Non-tender. Non-distended. Normal bowel sounds.   Skin: Warm and dry. No nodule on exposed extremities. No rash on exposed extremities.   M/S: No cyanosis. No joint deformity. No clubbing.   Neuro: Awake. No focal neurologic deficit on exam.  Cranial nerves II through XII intact.  Patient is able to ambulate without difficulty.  Psych: Per psychiatry team evaluation     CBC:   Recent Labs     07/08/25  1613   WBC 3.6*   HGB 12.5   HCT 37.8   MCV 94.8        BMP:   Recent Labs     07/08/25  1613      K 4.2      CO2 22   BUN 15   CREATININE 0.7     LIVER PROFILE:   Recent Labs     07/08/25  1613   AST 32   ALT 18   BILITOT <0.2   ALKPHOS 130*     PT/INR: No results for input(s): \"PROTIME\", \"INR\" in the last 72 hours.  APTT: No results for input(s): \"APTT\" in the last 72 hours.  UA:  Recent Labs     07/08/25  1522   COLORU Yellow   PHUR 5.0  6.0   CLARITYU SL CLOUDY*   LEUKOCYTESUR Negative   UROBILINOGEN 0.2   BILIRUBINUR Negative   BLOODU Negative   GLUCOSEU Negative       U/A:    Lab Results   Component Value Date/Time    COLORU Yellow 07/08/2025 03:22 PM    CLARITYU SL CLOUDY

## 2025-07-09 NOTE — PLAN OF CARE
Problem: Anxiety  Goal: Will report anxiety at manageable levels  Description: INTERVENTIONS:  1. Administer medication as ordered  2. Teach and rehearse alternative coping skills  3. Provide emotional support with 1:1 interaction with staff  Outcome: Progressing     Problem: Depression/Self Harm  Goal: Effect of psychiatric condition will be minimized and patient will be protected from self harm  Description: INTERVENTIONS:  1. Assess impact of patient's symptoms on level of functioning, self care needs and offer support as indicated  2. Assess patient/family knowledge of depression, impact on illness and need for teaching  3. Provide emotional support, presence and reassurance  4. Assess for possible suicidal thoughts or ideation. If patient expresses suicidal thoughts or statements do not leave alone, initiate Suicide Precautions, move to a room close to the nursing station and obtain sitter  5. Initiate consults as appropriate with Mental Health Professional, Spiritual Care, Psychosocial CNS, and consider a recommendation to the LIP for a Psychiatric Consultation  Outcome: Progressing    Pt has been mostly isolative to her room. She denies SI/HI/AVH and no RTIS noted. Pt does report recent stress from her boyfriend going into rehab and her relapse. Denies needs currently

## 2025-07-09 NOTE — PLAN OF CARE
[facility]  Treatment Team Note  Review Date & Time: 07/09/50  0950    Patient was not in treatment team      Status EXAM:   Mental Status and Behavioral Exam  Normal: No  Level of Assistance: Independent/Self  Facial Expression: Brightened  Affect: Appropriate  Level of Consciousness: Alert  Frequency of Checks: 4 times per hour, close  Mood:Normal: No  Mood: Sad, Ashamed/humiliated  Motor Activity:Normal: Yes  Eye Contact: Good  Observed Behavior: Friendly, Cooperative, Preoccupied  Sexual Misconduct History: Current - no  Preception: Tofte to person, Tofte to time, Tofte to place, [place]te to situation  Attention:Normal: Yes  Thought Processes: Unremarkable  Thought Content:Normal: No  Thought Content: Preoccupations  Depression Symptoms: Appetite change, Sleep disturbance  Anxiety Symptoms: Generalized  Beba Symptoms: No problems reported or observed.  Hallucinations: None  Delusions: No  Memory:Normal: Yes  Insight and Judgment: No  Insight and Judgment: Poor judgment, Poor insight      Suicide Risk CSSR-S:  1) Within the past month, have you wished you were dead or wished you could go to sleep and not wake up? : Yes  2) Have you actually had any thoughts of killing yourself? : Yes  3) Have you been thinking about how you might kill yourself? : Yes  5) Have you started to work out or worked out the details of how to kill yourself? Do you intend to carry out this plan? : Yes  6) Have you ever done anything, started to do anything, or prepared to do anything to end your life?: Yes      PLAN/TREATMENT RECOMMENDATIONS UPDATE:   Patient will take medication as prescribed, eat 75% of meals, attend groups, participate in milieu activities, participate in treatment team and care planning for discharge and follow up.            Belkis Robison RN

## 2025-07-09 NOTE — CARE COORDINATION
SW completed psychosocial assessment, CSSR-S, leisure assessment and OQ Analyst with patient. Patient was friendly and cooperative in answering questions.     During the assessment, SW:   Verified discharging address: [x]: [facility]  Verified current services: Medication provider [facility], Therapy [facility]  Discussed potential discharge plan:Mental Health, LARISSA, and Medication Management, in-person  Insurance: Medicaid  Yarely Lucio, [facility]       07/09/25 8935   Psychiatric History   Psychiatric history treatment Psychiatric admissions;Current treatment  (psychiatrist and therapist- [facility] [place], sees weekly every wednesday. Pt states she has had many previous hospitalizations.)   Are there any medication issues? No   Recent Psychological Experiences Other(comment)  (Pt endorsed increase alcohol use due to boyfriend relapsing on meth after being clean for a year. pt states she has been drinking about half a gallon of vodka per week)   Support System   Support system Support groups    Types of Support System Other (Comment)  (Mu-ism group)   Problems in support system None   Current Living Situation   Home Living Adequate   Living information Lives alone   Problems with living situation  No   Lack of basic needs No   SSDI/SSI none   Other government assistance medicaid and food stamps   Problems with environment none   Current abuse issues none   Supervised setting None   Relationship problems Yes   Relationship problems due to  Other (Comment)  (boyfriend recently abused meth after being sober)   Medical and Self-Care Issues   Relevant medical problems narcolepsy, sleep apnea, neck pain   Relevant self-care issues pt reports drinking gets in the way of self-care   Barriers to treatment No   Family [place]   Spouse/partner-name/age boyfriend- zurdo   Children-names/ages 2 daughter, 20 and 9 years old   Parents

## 2025-07-09 NOTE — GROUP NOTE
Group Therapy Note    Date: 7/9/2025    Group Start Time: 1100  Group End Time: 1145  Group Topic: Cognitive Skills    Fernie Beaver        Group Therapy Note    Attendees: 10    Patient was invited to group but unable to attend.         Signature:  FERNIE TRAN

## 2025-07-10 VITALS
SYSTOLIC BLOOD PRESSURE: 112 MMHG | WEIGHT: 230 LBS | HEART RATE: 58 BPM | OXYGEN SATURATION: 99 % | RESPIRATION RATE: 17 BRPM | DIASTOLIC BLOOD PRESSURE: 61 MMHG | BODY MASS INDEX: 39.27 KG/M2 | HEIGHT: 64 IN | TEMPERATURE: 98.4 F

## 2025-07-10 PROBLEM — F32.2 CURRENT SEVERE EPISODE OF MAJOR DEPRESSIVE DISORDER WITHOUT PSYCHOTIC FEATURES (HCC): Status: ACTIVE | Noted: 2025-07-10

## 2025-07-10 PROBLEM — F43.10 PTSD (POST-TRAUMATIC STRESS DISORDER): Status: ACTIVE | Noted: 2025-07-10

## 2025-07-10 PROCEDURE — 6370000000 HC RX 637 (ALT 250 FOR IP): Performed by: PHYSICIAN ASSISTANT

## 2025-07-10 PROCEDURE — 99233 SBSQ HOSP IP/OBS HIGH 50: CPT | Performed by: PSYCHIATRY & NEUROLOGY

## 2025-07-10 PROCEDURE — 6370000000 HC RX 637 (ALT 250 FOR IP)

## 2025-07-10 PROCEDURE — 1240000000 HC EMOTIONAL WELLNESS R&B

## 2025-07-10 PROCEDURE — 6370000000 HC RX 637 (ALT 250 FOR IP): Performed by: PSYCHIATRY & NEUROLOGY

## 2025-07-10 RX ORDER — MODAFINIL 200 MG/1
200 TABLET ORAL 2 TIMES DAILY
Status: DISCONTINUED | OUTPATIENT
Start: 2025-07-10 | End: 2025-07-11 | Stop reason: HOSPADM

## 2025-07-10 RX ORDER — OLANZAPINE 10 MG/1
10 TABLET, FILM COATED ORAL NIGHTLY PRN
Qty: 15 TABLET | Refills: 0 | Status: SHIPPED | OUTPATIENT
Start: 2025-07-10

## 2025-07-10 RX ORDER — MULTIVITAMIN WITH IRON
1 TABLET ORAL DAILY
Qty: 30 TABLET | Refills: 0 | Status: SHIPPED | OUTPATIENT
Start: 2025-07-11

## 2025-07-10 RX ORDER — LANOLIN ALCOHOL/MO/W.PET/CERES
100 CREAM (GRAM) TOPICAL DAILY
Qty: 30 TABLET | Refills: 0 | Status: SHIPPED | OUTPATIENT
Start: 2025-07-11

## 2025-07-10 RX ADMIN — MODAFINIL 200 MG: 200 TABLET ORAL at 07:59

## 2025-07-10 RX ADMIN — ACETAMINOPHEN 650 MG: 325 TABLET ORAL at 10:49

## 2025-07-10 RX ADMIN — Medication 100 MG: at 07:59

## 2025-07-10 RX ADMIN — TRAZODONE HYDROCHLORIDE 50 MG: 50 TABLET ORAL at 20:57

## 2025-07-10 RX ADMIN — ALPRAZOLAM 1 MG: 1 TABLET ORAL at 17:08

## 2025-07-10 RX ADMIN — ACETAMINOPHEN 650 MG: 325 TABLET ORAL at 20:56

## 2025-07-10 RX ADMIN — OLANZAPINE 10 MG: 10 TABLET, FILM COATED ORAL at 00:43

## 2025-07-10 RX ADMIN — VENLAFAXINE HYDROCHLORIDE 75 MG: 75 CAPSULE, EXTENDED RELEASE ORAL at 07:59

## 2025-07-10 RX ADMIN — BUPROPION HYDROCHLORIDE 300 MG: 300 TABLET, EXTENDED RELEASE ORAL at 07:59

## 2025-07-10 RX ADMIN — CHOLECALCIFEROL TAB 125 MCG (5000 UNIT) 5000 UNITS: 125 TAB at 07:59

## 2025-07-10 RX ADMIN — MODAFINIL 200 MG: 200 TABLET ORAL at 14:23

## 2025-07-10 RX ADMIN — THERA TABS 1 TABLET: TAB at 07:59

## 2025-07-10 RX ADMIN — FOLIC ACID 1 MG: 1 TABLET ORAL at 07:59

## 2025-07-10 ASSESSMENT — PAIN SCALES - GENERAL
PAINLEVEL_OUTOF10: 6
PAINLEVEL_OUTOF10: 2
PAINLEVEL_OUTOF10: 0
PAINLEVEL_OUTOF10: 3
PAINLEVEL_OUTOF10: 5
PAINLEVEL_OUTOF10: 5

## 2025-07-10 ASSESSMENT — PAIN DESCRIPTION - PAIN TYPE: TYPE: CHRONIC PAIN

## 2025-07-10 ASSESSMENT — PAIN - FUNCTIONAL ASSESSMENT
PAIN_FUNCTIONAL_ASSESSMENT: ACTIVITIES ARE NOT PREVENTED

## 2025-07-10 ASSESSMENT — PAIN DESCRIPTION - LOCATION
LOCATION: NECK
LOCATION: NECK

## 2025-07-10 ASSESSMENT — PAIN DESCRIPTION - DESCRIPTORS
DESCRIPTORS: ACHING;TIGHTNESS

## 2025-07-10 ASSESSMENT — PAIN SCALES - WONG BAKER: WONGBAKER_NUMERICALRESPONSE: NO HURT

## 2025-07-10 ASSESSMENT — PAIN DESCRIPTION - FREQUENCY: FREQUENCY: INTERMITTENT

## 2025-07-10 NOTE — PROGRESS NOTES
Department of Psychiatry  AttendingProgress Note  Chief Complaint: depression   Shahid has been in aspects of program. She is not exhibiting any withdrawal sxs . DC CIWA and Valium.  She is feeling less depressed but struggled at bedtime and was given prn Zyprexa 10 mg HS which helped her sleep .   Modafinil was moved to AM and noon.   She state that she saw figures in her room last night but it's not clear if they were dreams or in a twilight state.   No history of psychosis.  Will dc tomorrow and she will follow up with outpt tx at [facility].   Had beenin an dout of tx facilities for 18 months , 1 1/2 years ago.  Patient's chart was reviewed and collaborated with  about the treatment plan.  SUBJECTIVE:    Patient is feeling better. Suicidal ideation:  denies suicidal ideation.  Patient does not have medication side effects.    ROS: Patient has new complaints: no  Sleeping adequately:  Yes   Appetite adequate: Yes  Attending groups: Yes  Visitors:No    OBJECTIVE    Physical  VITALS:  /77   Pulse 64   Temp 97.5 °F (36.4 °C) (Oral)   Resp 18   Ht 1.626 m (5' 4\")   Wt 104.3 kg (230 lb)   SpO2 100%   Breastfeeding No   BMI 39.48 kg/m²     Mental Status Examination:  Patients appearance was street clothes. Thoughts are Goal directed. Homicidal ideations none.  No abnormal movements, tics or mannerisms.  Memory intact Aims 0. Concentration Fair.   Alert and oriented X 4. Insight and Judgement impaired insight. Patient was cooperative. Patient gait normal. Mood constricted, affect depressed affect Hallucinations Absent, suicidal ideations no specific plan to harm self Speech normal volume  Data  Labs:   Admission on 07/08/2025   Component Date Value Ref Range Status    Color, UA 07/08/2025 Yellow  Straw/Yellow Final    Clarity, UA 07/08/2025 SL CLOUDY (A)  Clear Final    Glucose, Ur 07/08/2025 Negative  Negative mg/dL Final    Bilirubin, Urine 07/08/2025 Negative  Negative Final     hydroxide (MILK OF MAGNESIA) 400 MG/5ML suspension 30 mL, 30 mL, Oral, Daily PRN  nicotine (NICODERM CQ) 21 MG/24HR 1 patch, 1 patch, TransDERmal, Daily  nicotine polacrilex (COMMIT) lozenge 2 mg, 2 mg, Oral, Q1H PRN  aluminum & magnesium hydroxide-simethicone (MAALOX PLUS) 200-200-20 MG/5ML suspension 30 mL, 30 mL, Oral, Q6H PRN  hydrOXYzine HCl (ATARAX) tablet 50 mg, 50 mg, Oral, TID PRN  OLANZapine (ZYPREXA) tablet 10 mg, 10 mg, Oral, Q4H PRN **OR** OLANZapine (ZyPREXA) 10 mg in sterile water 2 mL injection, 10 mg, IntraMUSCular, Q4H PRN  benztropine mesylate (COGENTIN) injection 2 mg, 2 mg, IntraMUSCular, BID PRN  traZODone (DESYREL) tablet 50 mg, 50 mg, Oral, Nightly PRN    ASSESSMENT AND PLAN    Principal Problem:    Current severe episode of major depressive disorder without psychotic features (HCC)  Active Problems:    Acute alcoholic intoxication without complication    Cervical radiculitis    Obesity (BMI 35.0-39.9 without comorbidity)    Suicidal ideation    PTSD (post-traumatic stress disorder)  Resolved Problems:    * No resolved hospital problems. *       1.Patient s symptoms   are improving  2.Probable discharge is tomorrow  3.Discharge planning is complete  4. Suicidal ideation is none  5. Total time with patient was 50 minutes and more than 50 % of that time was spent counseling the patient on their symptoms, treatment and expected goals.        Anthony Goyal MD  Physician Psychiatry

## 2025-07-10 NOTE — TRANSITION OF CARE
Behavioral Health Transition Record    Patient Name: Shahid Garcia  YOB: 1985   Medical Record Number: [id number]  Date of Admission: 7/8/2025  3:14 PM   Date of Discharge: 7/11/2025    Attending Provider: Anthony Goyal MD   Discharging Provider: Anthony Goyal MD    To contact this individual call [phone] and ask the  to page.  If unavailable, ask to be transferred to Behavioral Health Provider on call.  A Behavioral Health Provider will be available on call 24/7 and during holidays.    Primary Care Provider: No primary care provider on file.    Allergies   Allergen Reactions    Nsaids Other (See Comments)     ulcers       Reason for Admission: Shahid is a 40 y.o. female who presented to the ED at [facility] after patient was feeling suicidal after she broke up with her fiance 2 weeks ago after he relapsed on meth and admitted to cheating on her.   She had been sober for 3 months form alcohol and after the break up she began drinking heavily and was up to 2 fifths of Vodka daily and yesterday she was intoxicated and She was drinking to kill herself. She had numerous suicide attempts in the past including overdoses.   She had called her psychiatrist yesterday who referred her to the hospital. She decided to drive herself while intoxicated and recall how she got here and had a blackout at home as well.      She stated that she had used methamphetamine for many years form ages [age] as this was her DOC.She also had been drinking heavily for many years    Admission Diagnosis: Suicidal ideation [R45.851]  Major depression, recurrent [F33.9]  Major depressive episode [F32.9]  Suicide attempt by alcohol poisoning, initial encounter ([facility]) [T51.92XA]  Acute alcoholic intoxication without complication [F10.920]    * No surgery found *    Results for orders placed or performed during the hospital encounter of 07/08/25   Urinalysis with Reflex to Culture    Specimen: Urine   Result  capsule  Commonly known as: PROMETRIUM     venlafaxine 75 MG extended release capsule  Commonly known as: EFFEXOR XR     vitamin D3 125 MCG (5000 UT) Tabs tablet  Commonly known as: CHOLECALCIFEROL     Zeposia 0.92 MG Caps  Generic drug: Ozanimod HCl            STOP taking these medications      disulfiram 250 MG tablet  Commonly known as: ANTABUSE     metoclopramide 10 MG tablet  Commonly known as: REGLAN     nicotine 21 MG/24HR  Commonly known as: NICODERM CQ     vitamin B-1 100 MG tablet  Commonly known as: THIAMINE               Where to Get Your Medications        These medications were sent to [facility]  - [place], [place] - [address] - P [phone] - F [phone]  [address] [address]      Phone: [phone]   multivitamin Tabs tablet  OLANZapine 10 MG tablet  thiamine 100 MG tablet         Unresulted Labs (24h ago, onward)      None            To obtain results of studies pending at discharge, please contact [phone]      The crisis number for [facility] is [phone].  You can use this number at any time to access emergency mental health services.  The National Suicide and Crisis Hotline Number is 988.  You can call, chat, or text this number at any time to access emergency mental health services.      Follow-up Information       Follow up With Specialties Details Why Contact Info [facility]  Go on 7/16/2025 Attend appointment with ran Heaton for therapy and psychiatry and subsance use on Wednesday 7/16/25 [id number] [address]  [address]  Phone: [phone]             Advanced Directive:   Does the patient have an appointed surrogate decision maker? No  Does the patient have a Medical Advance Directive? No  Does the patient have a Psychiatric Advance Directive? No  If the patient does not have a surrogate or Medical Advance Directive AND Psychiatric Advance Directive, the

## 2025-07-10 NOTE — PLAN OF CARE
Problem: Seizure Precautions  Goal: Remains free of injury related to seizures activity  Outcome: Progressing     Problem: Anxiety  Goal: Will report anxiety at manageable levels  Description: INTERVENTIONS:  1. Administer medication as ordered  2. Teach and rehearse alternative coping skills  3. Provide emotional support with 1:1 interaction with staff  Outcome: Progressing     Problem: Coping  Goal: Pt/Family able to verbalize concerns and demonstrate effective coping strategies  Description: INTERVENTIONS:  1. Assist patient/family to identify coping skills, available support systems and cultural and spiritual values  2. Provide emotional support, including active listening and acknowledgement of concerns of patient and caregivers  3. Reduce environmental stimuli, as able  4. Instruct patient/family in relaxation techniques, as appropriate  5. Assess for spiritual pain/suffering and initiate Spiritual Care, Psychosocial Clinical Specialist consults as needed  Outcome: Progressing     Problem: Depression/Self Harm  Goal: Effect of psychiatric condition will be minimized and patient will be protected from self harm  Description: INTERVENTIONS:  1. Assess impact of patient's symptoms on level of functioning, self care needs and offer support as indicated  2. Assess patient/family knowledge of depression, impact on illness and need for teaching  3. Provide emotional support, presence and reassurance  4. Assess for possible suicidal thoughts or ideation. If patient expresses suicidal thoughts or statements do not leave alone, initiate Suicide Precautions, move to a room close to the nursing station and obtain sitter  5. Initiate consults as appropriate with Mental Health Professional, Spiritual Care, Psychosocial CNS, and consider a recommendation to the LIP for a Psychiatric Consultation  Outcome: Progressing     Problem: Drug Abuse/Detox  Goal: Will have no detox symptoms and will verbalize plan for changing  drug-related behavior  Description: INTERVENTIONS:  1. Administer medication as ordered  2. Monitor physical status  3. Provide emotional support with 1:1 interaction with staff  4. Encourage  recovery focused treatment   Outcome: Progressing

## 2025-07-10 NOTE — PROGRESS NOTES
Patient has been primarily withdrawn to her today. She comes out to have her needs met. Showered. Affect blunted. Able to voice her needs.     Medication compliant. PRN Tylenol 650 mg PO for neck and migraine pain rated 3/10. Medication was minimally effective. PRN Xanax 1708 for anxiety, medication effective. She denies any suicidal or homicidal ideation. Denies any hallucinations.    Patient discharges Friday morning (7/11) after breakfast. Her take home meds are in her locker*    Vitals:    07/10/25 0751   BP: 124/77   Pulse: 64   Resp: 18   Temp: 97.5 °F (36.4 °C)   SpO2: 100%

## 2025-07-10 NOTE — GROUP NOTE
Group Therapy Note    Date: 7/10/2025    Group Start Time: 1000  Group End Time: 1045  Group Topic: Cognitive Skills    [facility]    Lorri Talbot LISW        Group Therapy Note    SW provided documentation on cognitive distortions and growth mindset. SW explained what it was and if they had any questions.    Attendees: 11    Signature:  CATALINA Huertas

## 2025-07-10 NOTE — PLAN OF CARE
Problem: Pain  Goal: Verbalizes/displays adequate comfort level or baseline comfort level  Outcome: Progressing   Patient is complaining of neck and head pain rated 3/10. PRN Tylenol 650 mg PO administered at 1049. Patient reports that the medication was mildly helpful and that her pain level was 2/10. Patient administered her scheduled modafinil for her migraines.
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
## 2025-07-11 PROCEDURE — 99239 HOSP IP/OBS DSCHRG MGMT >30: CPT | Performed by: PSYCHIATRY & NEUROLOGY

## 2025-07-11 PROCEDURE — 6370000000 HC RX 637 (ALT 250 FOR IP)

## 2025-07-11 PROCEDURE — 5130000000 HC BRIDGE APPOINTMENT

## 2025-07-11 PROCEDURE — 6370000000 HC RX 637 (ALT 250 FOR IP): Performed by: PSYCHIATRY & NEUROLOGY

## 2025-07-11 PROCEDURE — 6370000000 HC RX 637 (ALT 250 FOR IP): Performed by: PHYSICIAN ASSISTANT

## 2025-07-11 RX ADMIN — THERA TABS 1 TABLET: TAB at 07:46

## 2025-07-11 RX ADMIN — MODAFINIL 200 MG: 200 TABLET ORAL at 07:46

## 2025-07-11 RX ADMIN — Medication 100 MG: at 07:46

## 2025-07-11 RX ADMIN — ACETAMINOPHEN 650 MG: 325 TABLET ORAL at 06:23

## 2025-07-11 RX ADMIN — BUPROPION HYDROCHLORIDE 300 MG: 300 TABLET, EXTENDED RELEASE ORAL at 07:46

## 2025-07-11 RX ADMIN — FOLIC ACID 1 MG: 1 TABLET ORAL at 07:46

## 2025-07-11 RX ADMIN — CHOLECALCIFEROL TAB 125 MCG (5000 UNIT) 5000 UNITS: 125 TAB at 07:46

## 2025-07-11 RX ADMIN — VENLAFAXINE HYDROCHLORIDE 75 MG: 75 CAPSULE, EXTENDED RELEASE ORAL at 07:46

## 2025-07-11 ASSESSMENT — PAIN - FUNCTIONAL ASSESSMENT: PAIN_FUNCTIONAL_ASSESSMENT: ACTIVITIES ARE NOT PREVENTED

## 2025-07-11 ASSESSMENT — PAIN DESCRIPTION - LOCATION: LOCATION: BACK;NECK

## 2025-07-11 ASSESSMENT — PAIN SCALES - GENERAL
PAINLEVEL_OUTOF10: 6
PAINLEVEL_OUTOF10: 0

## 2025-07-11 ASSESSMENT — PAIN DESCRIPTION - DESCRIPTORS: DESCRIPTORS: ACHING;DISCOMFORT

## 2025-07-11 NOTE — PLAN OF CARE
Shahid is on discharge. States feeling ready to go home and then has plans to go to a treatment facility. Goal future oriented. Denies any thoughts to harm self. No unsafe behaviors observed. Friendly and cooperative in interactions.   Reviewed discharge plan and safety plan. Voiced understanding of all discharge instructions.     Problem: Seizure Precautions  Goal: Remains free of injury related to seizures activity  7/11/2025 0825 by Brenda Nava RN  Outcome: Completed  7/10/2025 1837 by Jocelyn Hilario RN  Outcome: Progressing     Problem: Anxiety  Goal: Will report anxiety at manageable levels  Description: INTERVENTIONS:  1. Administer medication as ordered  2. Teach and rehearse alternative coping skills  3. Provide emotional support with 1:1 interaction with staff  7/11/2025 0825 by Brenda Nava RN  Outcome: Completed  7/10/2025 2200 by Gomez Neff, RN  Outcome: Progressing  7/10/2025 1837 by Jocelyn Hilario RN  Outcome: Progressing     Problem: Coping  Goal: Pt/Family able to verbalize concerns and demonstrate effective coping strategies  Description: INTERVENTIONS:  1. Assist patient/family to identify coping skills, available support systems and cultural and spiritual values  2. Provide emotional support, including active listening and acknowledgement of concerns of patient and caregivers  3. Reduce environmental stimuli, as able  4. Instruct patient/family in relaxation techniques, as appropriate  5. Assess for spiritual pain/suffering and initiate Spiritual Care, Psychosocial Clinical Specialist consults as needed  7/11/2025 0825 by Brenda Nava RN  Outcome: Completed  7/10/2025 2200 by Gomez Neff, RN  Outcome: Progressing  7/10/2025 1837 by Jocelyn Hilario RN  Outcome: Progressing     Problem: Depression/Self Harm  Goal: Effect of psychiatric condition will be minimized and patient will be protected from self harm  Description: INTERVENTIONS:  1. Assess impact of patient's

## 2025-07-11 NOTE — PROGRESS NOTES
[facility]  Discharge Note    Pt discharged with followings belongings:   Dental Appliances: None  Vision - Corrective Lenses: None  Hearing Aid: None  Jewelry: None  Body Piercings Removed: N/A  Clothing: Footwear, Shirt, Pants, Undergarments  Other Valuables: Wallet, Credit/Debit Card (book bag)   Valuables returned to patient. Patient educated on aftercare instructions: YES  Patient verbalize understanding of AVS:  YES.    Status EXAM upon discharge:  Mental Status and Behavioral Exam  Normal: No  Level of Assistance: Independent/Self  Facial Expression: Brightened  Affect: Appropriate  Level of Consciousness: Alert  Frequency of Checks: 4 times per hour, close  Mood:Normal: No  Mood: Anxious  Motor Activity:Normal: Yes  Eye Contact: Good  Observed Behavior: Cooperative, Friendly  Sexual Misconduct History: Current - no  Preception: [place] to person, [place] to time, [place] to place, [place] to situation  Attention:Normal: Yes  Thought Processes: Unremarkable  Thought Content:Normal: Yes  Thought Content: Preoccupations  Depression Symptoms: Appetite change  Anxiety Symptoms: Generalized  Beba Symptoms: No problems reported or observed.  Hallucinations: None  Delusions: No  Memory:Normal: Yes  Insight and Judgment: No  Insight and Judgment: Poor judgment    Tobacco Screening:  Practical Counseling, on admission, ara X, if applicable and completed (first 3 are required if patient doesn't refuse):            ( ) Recognizing danger situations (included triggers and roadblocks)                    ( ) Coping skills (new ways to manage stress,relaxation techniques, changing routine, distraction)                                                           ( ) Basic information about quitting (benefits of quitting, techniques in how to quit, available resources  ( ) [facility]

## 2025-07-11 NOTE — PROGRESS NOTES
Bridge Appointment completed: Reviewed Discharge Instructions with patient.    Patient verbalizes understanding and agreement with the discharge plan using the teachback method.     Referral for Outpatient Tobacco Cessation Counseling, upon discharge (ara X if applicable and completed):    ( )  Hospital staff assisted patient to call Quit Line or faxed referral                                   during hospitalization                  ( )  Recognizing danger situations (included triggers and roadblocks), if not completed on admission                    ( )  Coping skills (new ways to manage stress, exercise, relaxation techniques, changing routine, distraction), if not completed on admission                                                           ( )  Basic information about quitting (benefits of quitting, techniques in how to quit, available resources, if not completed on admission  ( ) [facility] for [facility]   (X ) Patient refused referral  (X ) Patient refused counseling  ( X) Patient refused smoking cessation medication upon discharge    Vaccinations (ara X if applicable and completed):  ( ) Patient states already received influenza vaccine elsewhere  ( ) Patient received influenza vaccine during this hospitalization  ( ) Patient refused influenza vaccine at this time  (x ) Not offered

## 2025-07-11 NOTE — DISCHARGE SUMMARY
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 (HCC)   Axis 2: No diagnosis  Elicia 3: See Medical History    And Present on Admission:   Acute alcoholic intoxication without complication   Cervical radiculitis   Obesity (BMI 35.0-39.9 without comorbidity)   Suicidal ideation   Current severe episode of major depressive disorder without psychotic features (HCC)   PTSD (post-traumatic stress disorder)     Axis 4: Problems related to the social environment  Axis 5:  On Admission: 41-50 serious symptoms At Discharge: 61-70 mild symptoms   All conditions on Axis 1 and Axis 2 and active problems on Axis 3 were treated while patient was hospitalized. (  Active Hospital Problems    Diagnosis Date Noted    Current severe episode of major depressive disorder without psychotic features (HCC) [F32.2] 07/10/2025    PTSD (post-traumatic stress disorder) [F43.10] 07/10/2025    Acute alcoholic intoxication without complication [F10.920] 07/09/2025    Cervical radiculitis [M54.12] 07/09/2025    Obesity (BMI 35.0-39.9 without comorbidity) [E66.9] 07/09/2025    Suicidal ideation [R45.851] 07/09/2025   )   Condition on DC  Mood and affect are stable and pt is not suicidal   VITALS:  /61   Pulse 58   Temp 98.4 °F (36.9 °C) (Oral)   Resp 17   Ht 1.626 m (5' 4\")   Wt 104.3 kg (230 lb)   SpO2 99%   Breastfeeding No   BMI 39.48 kg/m²   Brief Summary Present Illness     CC:  SI     HPI:   Patient seen in room on Adult Behavioral Unit.   Patient is a 40 y.o. female who presented to the ED at [facility] after patient was feeling suicidal after she broke up with her fiance 2 weeks ago after he relapsed on meth and admitted to cheating on her.   She had been sober

## 2025-07-11 NOTE — PLAN OF CARE
Pt remains withdrawn to her room, coming out only to express needs, but is cooperative with care. A/Ox4. Denies SI/HI/AVH, depression, and anxiety. Currently reports increased appetite, stating she feels hungry all the time. Pt c/o neck pain rated 5/10 and difficulty sleeping throughout the night. @2057, RN administered PRN Tylenol 650 mg PO and Trazodone 50 mg PO. Upon reassessment, pt found asleep in bed. Plan of care remains ongoing.    Problem: Anxiety  Goal: Will report anxiety at manageable levels  Description: INTERVENTIONS:  1. Administer medication as ordered  2. Teach and rehearse alternative coping skills  3. Provide emotional support with 1:1 interaction with staff  7/10/2025 2200 by Gomez Neff, RN  Outcome: Progressing     Problem: Coping  Goal: Pt/Family able to verbalize concerns and demonstrate effective coping strategies  Description: INTERVENTIONS:  1. Assist patient/family to identify coping skills, available support systems and cultural and spiritual values  2. Provide emotional support, including active listening and acknowledgement of concerns of patient and caregivers  3. Reduce environmental stimuli, as able  4. Instruct patient/family in relaxation techniques, as appropriate  5. Assess for spiritual pain/suffering and initiate Spiritual Care, Psychosocial Clinical Specialist consults as needed  7/10/2025 2200 by Gomez Neff, RN  Outcome: Progressing     Problem: Depression/Self Harm  Goal: Effect of psychiatric condition will be minimized and patient will be protected from self harm  Description: INTERVENTIONS:  1. Assess impact of patient's symptoms on level of functioning, self care needs and offer support as indicated  2. Assess patient/family knowledge of depression, impact on illness and need for teaching  3. Provide emotional support, presence and reassurance  4. Assess for possible suicidal thoughts or ideation. If patient expresses suicidal thoughts or statements do not leave

## 2025-07-14 ENCOUNTER — FOLLOWUP TELEPHONE ENCOUNTER (OUTPATIENT)
Dept: PSYCHIATRY | Age: 40
End: 2025-07-14

## 2025-07-15 ENCOUNTER — CLINICAL DOCUMENTATION (OUTPATIENT)
Age: 40
End: 2025-07-15

## 2025-07-15 NOTE — PROGRESS NOTES
First attempt to reach Pt for telechaplaincy referral. No answer on phone. Left voicemail for Pt and will attempt to follow up again at a later time.    Ramsey Orr

## 2025-07-18 ENCOUNTER — CLINICAL DOCUMENTATION (OUTPATIENT)
Age: 40
End: 2025-07-18

## 2025-07-18 NOTE — PROGRESS NOTES
Second attempt to reach Pt for outpatient telechaplaincy referral. No answer on phone. Left voicemail for Pt, informing her of our continued availability for support.     Ramsey Orr